# Patient Record
Sex: FEMALE | Race: WHITE
[De-identification: names, ages, dates, MRNs, and addresses within clinical notes are randomized per-mention and may not be internally consistent; named-entity substitution may affect disease eponyms.]

---

## 2017-04-21 ENCOUNTER — HOSPITAL ENCOUNTER (EMERGENCY)
Dept: HOSPITAL 23 - CED | Age: 27
Discharge: HOME | End: 2017-04-21
Payer: COMMERCIAL

## 2017-04-21 DIAGNOSIS — N39.0: Primary | ICD-10-CM

## 2017-04-21 LAB
AMYLASE: 7 U/L (ref 0–46)
BARBITURATES UR QL SCN: 0.5 MG/DL (ref 0.2–2)
BARBITURATES UR QL SCN: 4.1 G/DL (ref 3.5–5)
BASOPHIL#: 0.1 X10E3 (ref 0–0.3)
BASOPHIL%: 1 % (ref 0–2.5)
BENZODIAZ UR QL SCN: 12 U/L (ref 10–40)
BENZODIAZ UR QL SCN: 17 U/L (ref 10–42)
BLOOD UREA NITROGEN: 13 MG/DL (ref 9–23)
BUN/CREATININE RATIO: 18.57
BZE UR QL SCN: 45 U/L (ref 32–92)
CALCIUM SERUM: 8.9 MG/DL (ref 8.4–10.2)
CK MB SERPL-RTO: 13.1 % (ref 11–15.5)
CK MB SERPL-RTO: 33.6 G/DL (ref 30–36)
CREATININE SERUM: 0.7 MG/DL (ref 0.6–1.4)
DIFF IND: NO
EOSINOPHIL#: 0.2 X10E3 (ref 0–0.7)
EOSINOPHIL%: 2.6 % (ref 0–7)
GENTAMICIN PEAK SERPL-MCNC: YES MG/L
GLOM FILT RATE ESTIMATED: 117.9 ML/MIN (ref 60–?)
GLUCOSE FASTING: 80 MG/DL (ref 70–110)
HEMATOCRIT: 39.6 % (ref 35–45)
HEMOGLOBIN: 13.3 GM/DL (ref 12–16)
KETONES UR QL: 105 MMOL/L (ref 100–111)
KETONES UR QL: 24 MMOL/L (ref 22–31)
LIPASE: 20 U/L (ref 22–51)
LYMPHOCYTE#: 2.5 X10E3 (ref 1–3.5)
LYMPHOCYTE%: 37.1 % (ref 17–45)
MEAN CELL VOLUME: 95.3 FL (ref 83–96)
MEAN CORPUSCULAR HEMOGLOBIN: 32 PG (ref 28–34)
MEAN PLATELET VOLUME: 8.2 FL (ref 6.5–11.5)
MONOCYTE#: 0.5 X10E3 (ref 0–1)
MONOCYTE%: 7.6 % (ref 3–12)
NEUTROPHIL#: 3.5 X10E3 (ref 1.5–7.1)
NEUTROPHIL%: 51.7 % (ref 40–75)
PLATELET COUNT: 290 X10E3 (ref 140–420)
POTASSIUM: 3.5 MMOL/L (ref 3.5–5.1)
PROTEIN TOTAL SERUM: 7 G/DL (ref 6–8.3)
RED BLOOD COUNT: 4.16 X10E (ref 3.9–5.3)
SODIUM: 138 MMOL/L (ref 135–145)
URBCS1 AUWI: (no result) /[HPF] (ref 0–2)
URINE APPEARANCE: (no result)
URINE BACTERIA AUWI: (no result)
URINE BILIRUBIN: (no result)
URINE BLOOD: (no result)
URINE COLOR: YELLOW
URINE GLUCOSE: (no result) MG/DL
URINE KETONE: (no result)
URINE LEUKOCYTE ESTERASE: (no result)
URINE NITRATE: (no result)
URINE PH: 6 (ref 5–8)
URINE PROTEIN: (no result)
URINE SOURCE: (no result)
URINE SPECIFIC GRAVITY: 1.01 (ref 1–1.03)
URINE SQUAMOUS EPITHELIAL CELL: (no result) /[HPF]
URINE UROBILINOGEN: 0.2 MG/DL
UWBCS1 AUWI: (no result) (ref 0–5)
WHITE BLOOD COUNT: 6.8 X10E3 (ref 4–10.5)

## 2018-01-29 ENCOUNTER — HOSPITAL ENCOUNTER (EMERGENCY)
Facility: HOSPITAL | Age: 28
Discharge: HOME | End: 2018-01-29
Attending: EMERGENCY MEDICINE | Admitting: EMERGENCY MEDICINE

## 2018-01-29 VITALS
BODY MASS INDEX: 21.71 KG/M2 | HEIGHT: 61 IN | DIASTOLIC BLOOD PRESSURE: 70 MMHG | TEMPERATURE: 98.9 F | RESPIRATION RATE: 18 BRPM | WEIGHT: 115 LBS | SYSTOLIC BLOOD PRESSURE: 108 MMHG | OXYGEN SATURATION: 98 % | HEART RATE: 80 BPM

## 2018-01-29 DIAGNOSIS — K08.89 PAIN, DENTAL: Primary | ICD-10-CM

## 2018-01-29 LAB
FLUAV AG NPH QL: NEGATIVE
FLUBV AG NPH QL IA: NEGATIVE

## 2018-01-29 PROCEDURE — 87804 INFLUENZA ASSAY W/OPTIC: CPT | Performed by: PHYSICIAN ASSISTANT

## 2018-01-29 PROCEDURE — 99283 EMERGENCY DEPT VISIT LOW MDM: CPT

## 2018-01-29 RX ORDER — PENICILLIN V POTASSIUM 500 MG/1
500 TABLET ORAL 4 TIMES DAILY
COMMUNITY

## 2018-01-29 RX ORDER — ONDANSETRON 4 MG/1
4 TABLET, ORALLY DISINTEGRATING ORAL ONCE
Status: COMPLETED | OUTPATIENT
Start: 2018-01-29 | End: 2018-01-29

## 2018-01-29 RX ORDER — ACETAMINOPHEN AND CODEINE PHOSPHATE 300; 30 MG/1; MG/1
1 TABLET ORAL ONCE
Status: COMPLETED | OUTPATIENT
Start: 2018-01-29 | End: 2018-01-29

## 2018-01-29 RX ORDER — ACETAMINOPHEN AND CODEINE PHOSPHATE 300; 30 MG/1; MG/1
1 TABLET ORAL EVERY 4 HOURS PRN
Qty: 12 TABLET | Refills: 0 | Status: SHIPPED | OUTPATIENT
Start: 2018-01-29

## 2018-01-29 RX ADMIN — ONDANSETRON 4 MG: 4 TABLET, ORALLY DISINTEGRATING ORAL at 14:17

## 2018-01-29 RX ADMIN — BENZOCAINE 1 EACH: 200 LIQUID DENTAL; ORAL; PERIODONTAL at 14:17

## 2018-01-29 RX ADMIN — ACETAMINOPHEN AND CODEINE PHOSPHATE 1 TABLET: 300; 30 TABLET ORAL at 14:17

## 2021-08-25 ENCOUNTER — E-VISIT (OUTPATIENT)
Dept: FAMILY MEDICINE CLINIC | Facility: TELEHEALTH | Age: 31
End: 2021-08-25

## 2021-08-25 DIAGNOSIS — B34.9 VIRAL ILLNESS: Primary | ICD-10-CM

## 2021-08-25 DIAGNOSIS — H10.9 BACTERIAL CONJUNCTIVITIS: ICD-10-CM

## 2021-08-25 PROCEDURE — 99422 OL DIG E/M SVC 11-20 MIN: CPT | Performed by: NURSE PRACTITIONER

## 2021-08-25 RX ORDER — BENZONATATE 100 MG/1
100 CAPSULE ORAL 3 TIMES DAILY PRN
Qty: 21 CAPSULE | Refills: 0 | Status: SHIPPED | OUTPATIENT
Start: 2021-08-25

## 2021-08-25 RX ORDER — TOBRAMYCIN 3 MG/ML
2 SOLUTION/ DROPS OPHTHALMIC
Qty: 4 ML | Refills: 0 | Status: SHIPPED | OUTPATIENT
Start: 2021-08-25 | End: 2021-09-01

## 2021-08-26 NOTE — PATIENT INSTRUCTIONS
Go to the nearest [facility], [place], operated by [facility] for your Covid-19 test. Wear a mask, call when you arrive, you will receive further instructions from the urgent care staff. You will be notified in 1-5 days about the results of your test, by telephone call from a blocked cell number, and via [employer].    -Cool compresses to eyes multiple times a day  -You are contagious for 24 hours after starting eye drops  -Do not wear contacts until healed.  -Discard contacts and eye make up used prior to infection  -If symptoms worsen or persist, follow up with opthamologist      Viral Illness, Adult  Viruses are tiny germs that can get into a person's body and cause illness. There are many different types of viruses, and they cause many types of illness. Viral illnesses can range from mild to severe. They can affect various parts of the body.  Common illnesses that are caused by a virus include colds and the flu (influenza). Viral illnesses also include serious conditions, such as HIV (human immunodeficiency virus) infection and AIDS (acquired immunodeficiency syndrome). A few viruses have been linked to certain cancers.  What are the causes?  Many types of viruses can cause illness. Viruses invade cells in your body, multiply, and cause the infected cells to work abnormally or die. When these cells die, they release more of the virus. When this happens, you develop symptoms of the illness, and the virus continues to spread to other cells. If the virus takes over the function of the cell, it can cause the cell to divide and grow out of control. This happens when a virus causes cancer.  Different viruses get into the body in different ways. You can get a virus by:  · Swallowing food or water that has come in contact with the virus (is contaminated).  · Breathing in droplets that have been coughed or sneezed into the air by an infected person.  · Touching a surface that has been contaminated with the virus and then touching your eyes, nose,  or mouth.  · Being bitten by an insect or animal that carries the virus.  · Having sexual contact with a person who is infected with the virus.  · Being exposed to blood or fluids that contain the virus, either through an open cut or during a transfusion.  If a virus enters your body, your body's defense system (immune system) will try to fight the virus. You may be at higher risk for a viral illness if your immune system is weak.  What are the signs or symptoms?  You may have these symptoms, depending on the type of virus and the location of the cells that it invades:  · Cold and flu viruses:  ? Fever.  ? Headache.  ? Sore throat.  ? Muscle aches.  ? Stuffy nose (nasal congestion).  ? Cough.  · Digestive system (gastrointestinal) viruses:  ? Fever.  ? Pain in the abdomen.  ? Nausea.  ? Diarrhea.  · Liver viruses (hepatitis):  ? Loss of appetite.  ? Tiredness.  ? Skin or the white parts of your eyes turning yellow (jaundice).  · Brain and spinal cord viruses:  ? Fever.  ? Headache.  ? Stiff neck.  ? Nausea and vomiting.  ? Confusion or sleepiness.  · Skin viruses:  ? Warts.  ? Itching.  ? Rash.  · Sexually transmitted viruses:  ? Discharge.  ? Swelling.  ? Redness.  ? Rash.  How is this diagnosed?  This condition may be diagnosed based on one or more of the following:  · Symptoms.  · Medical history.  · Physical exam.  · Blood test, sample of mucus from your lungs (sputum sample), stool sample, or a swab of body fluids or a skin sore (lesion).  How is this treated?  Viruses can be hard to treat because they live within cells. Antibiotic medicines do not treat viruses because these medicines do not get inside cells. Treatment for a viral illness may include:  · Resting and drinking plenty of fluids.  · Medicines to relieve symptoms. These can include over-the-counter medicine for pain and fever, medicines for cough or congestion, and medicines to relieve diarrhea.  · Antiviral medicines. These medicines are available  only for certain types of viruses. They may help reduce flu symptoms if taken early in the infection. There are also many antiviral medicines for hepatitis and for HIV and AIDS.  Some viral illnesses can be prevented with vaccinations. A common example is the flu shot.  Follow these instructions at home:  Medicines  · Take over-the-counter and prescription medicines only as told by your health care provider.  · If you were prescribed an antiviral medicine, take it as told by your health care provider. Do not stop taking the antiviral even if you start to feel better.  · Be aware of when antibiotics are needed and when they are not needed. Antibiotics do not treat viruses. You may get an antibiotic if your health care provider thinks that you may have, or are at risk for, a bacterial infection and you have a viral infection.  ? Do not ask for an antibiotic prescription if you have been diagnosed with a viral illness. Antibiotics will not make your illness go away faster.  ? Frequently taking antibiotics when they are not needed can lead to antibiotic resistance. When this develops, the medicine no longer works against the bacteria that it normally fights.  General instructions    · Drink enough fluids to keep your urine pale yellow.  · Rest as much as possible.  · Return to your normal activities as told by your health care provider. Ask your health care provider what activities are safe for you.  · Keep all follow-up visits as told by your health care provider. This is important.  How is this prevented?  To reduce your risk of viral illness:  · Wash your hands often with soap and water for at least 20 seconds. If soap and water are not available, use hand .  · Avoid touching your nose, eyes, and mouth, especially if you have not washed your hands recently.  · If anyone in your household has a viral infection, clean all household surfaces that may have been in contact with the virus. Use soap and hot water.  You may also use bleach that you have added water to (diluted).  · Stay away from people who are sick with symptoms of a viral infection.  · Do not share items such as toothbrushes and water bottles with other people.  · Keep your vaccinations up to date. This includes getting a yearly flu shot.  · Eat a healthy diet and get plenty of rest.  Contact a health care provider if:  · You have symptoms of a viral illness that do not go away.  · Your symptoms come back after going away.  · Your symptoms get worse.  Get help right away if you have:  · Trouble breathing.  · A severe headache or a stiff neck.  · Severe vomiting or pain in your abdomen.  These symptoms may represent a serious problem that is an emergency. Do not wait to see if the symptoms will go away. Get medical help right away. Call your local emergency services (911 in the [place] Do not drive yourself to the hospital.  Summary  · Viruses are types of germs that can get into a person's body and cause illness. Viral illnesses can range from mild to severe. They can affect various parts of the body.  · Viruses can be hard to treat. There are medicines to relieve symptoms, and there are some antiviral medicines.  · If you were prescribed an antiviral medicine, take it as told by your health care provider. Do not stop taking the antiviral even if you start to feel better.  · Contact a health care provider if you have symptoms of a viral illness that do not go away.  This information is not intended to replace advice given to you by your health care provider. Make sure you discuss any questions you have with your health care provider.  Document Revised: 10/27/2020 Document Reviewed: 10/27/2020  Elsevier Patient Education © 2021 [employer]  Bacterial Conjunctivitis, Adult  Bacterial conjunctivitis is an infection of the clear membrane that covers the white part of your eye and the inner surface of your eyelid (conjunctiva). When the blood vessels in your  conjunctiva become inflamed, your eye becomes red or pink, and it will probably feel itchy. Bacterial conjunctivitis spreads very easily from person to person (is contagious). It also spreads easily from one eye to the other eye.  What are the causes?  This condition is caused by bacteria. You may get the infection if you come into close contact with:  · A person who is infected with the bacteria.  · Items that are contaminated with the bacteria, such as a face towel, contact lens solution, or eye makeup.  What increases the risk?  You are more likely to develop this condition if you:  · Are exposed to other people who have the infection.  · Wear contact lenses.  · Have a sinus infection.  · Have had a recent eye injury or surgery.  · Have a weak body defense system (immune system).  · Have a medical condition that causes dry eyes.  What are the signs or symptoms?  Symptoms of this condition include:  · Thick, yellowish discharge from the eye. This may turn into a crust on the eyelid overnight and cause your eyelids to stick together.  · Tearing or watery eyes.  · Itchy eyes.  · Burning feeling in your eyes.  · Eye redness.  · Swollen eyelids.  · Blurred vision.  How is this diagnosed?  This condition is diagnosed based on your symptoms and medical history. Your health care provider may also take a sample of discharge from your eye to find the cause of your infection. This is rarely done.  How is this treated?  This condition may be treated with:  · Antibiotic eye drops or ointment to clear the infection more quickly and prevent the spread of infection to others.  · Oral antibiotic medicines to treat infections that do not respond to drops or ointments or that last longer than 10 days.  · Cool, wet cloths (cool compresses) placed on the eyes.  · Artificial tears applied 2-6 times a day.  Follow these instructions at home:  Medicines  · Take or apply your antibiotic medicine as told by your health care provider. Do  not stop taking or applying the antibiotic even if you start to feel better.  · Take or apply over-the-counter and prescription medicines only as told by your health care provider.  · Be very careful to avoid touching the edge of your eyelid with the eye-drop bottle or the ointment tube when you apply medicines to the affected eye. This will keep you from spreading the infection to your other eye or to other people.  Managing discomfort  · Gently wipe away any drainage from your eye with a warm, wet washcloth or a cotton ball.  · Apply a clean, cool compress to your eye for 10-20 minutes, 3-4 times a day.  General instructions  · Do not wear contact lenses until the inflammation is gone and your health care provider says it is safe to wear them again. Ask your health care provider how to sterilize or replace your contact lenses before you use them again. Wear glasses until you can resume wearing contact lenses.  · Avoid wearing eye makeup until the inflammation is gone. Throw away any old eye cosmetics that may be contaminated.  · Change or wash your pillowcase every day.  · Do not share towels or washcloths. This may spread the infection.  · Wash your hands often with soap and water. Use paper towels to dry your hands.  · Avoid touching or rubbing your eyes.  · Do not drive or use heavy machinery if your vision is blurred.  Contact a health care provider if:  · You have a fever.  · Your symptoms do not get better after 10 days.  Get help right away if you have:  · A fever and your symptoms suddenly get worse.  · Severe pain when you move your eye.  · Facial pain, redness, or swelling.  · Sudden loss of vision.  Summary  · Bacterial conjunctivitis is an infection of the clear membrane that covers the white part of your eye and the inner surface of your eyelid (conjunctiva).  · Bacterial conjunctivitis spreads very easily from person to person (is contagious).  · Wash your hands often with soap and water. Use paper  towels to dry your hands.  · Take or apply your antibiotic medicine as told by your health care provider. Do not stop taking or applying the antibiotic even if you start to feel better.  · Contact a health care provider if you have a fever or your symptoms do not get better after 10 days.  This information is not intended to replace advice given to you by your health care provider. Make sure you discuss any questions you have with your health care provider.  Document Revised: 04/07/2020 Document Reviewed: 07/24/2019  [employer] Patient Education © 2021 [employer].  Benzonatate capsules  What is this medicine?  BENZONATATE (mary JACOB na yu) is used to treat cough.  This medicine may be used for other purposes; ask your health care provider or pharmacist if you have questions.  COMMON BRAND NAME(S): Adam Tan  What should I tell my health care provider before I take this medicine?  They need to know if you have any of these conditions:  · kidney or liver disease  · an unusual or allergic reaction to benzonatate, anesthetics, other medicines, foods, dyes, or preservatives  · pregnant or trying to get pregnant  · breast-feeding  How should I use this medicine?  Take this medicine by mouth with a glass of water. Follow the directions on the prescription label. Avoid breaking, chewing, or sucking the capsule, as this can cause serious side effects. Take your medicine at regular intervals. Do not take your medicine more often than directed.  Talk to your pediatrician regarding the use of this medicine in children. While this drug may be prescribed for children as young as 10 years old for selected conditions, precautions do apply.  Overdosage: If you think you have taken too much of this medicine contact a poison control center or emergency room at once.  NOTE: This medicine is only for you. Do not share this medicine with others.  What if I miss a dose?  If you miss a dose, take it as soon as you can. If  it is almost time for your next dose, take only that dose. Do not take double or extra doses.  What may interact with this medicine?  Do not take this medicine with any of the following medications:  · MAOIs like Carbex, Eldepryl, Marplan, Nardil, and Parnate  This list may not describe all possible interactions. Give your health care provider a list of all the medicines, herbs, non-prescription drugs, or dietary supplements you use. Also tell them if you smoke, drink alcohol, or use illegal drugs. Some items may interact with your medicine.  What should I watch for while using this medicine?  Tell your doctor if your symptoms do not improve or if they get worse. If you have a high fever, skin rash, or headache, see your health care professional.  You may get drowsy or dizzy. Do not drive, use machinery, or do anything that needs mental alertness until you know how this medicine affects you. Do not sit or stand up quickly, especially if you are an older patient. This reduces the risk of dizzy or fainting spells.  What side effects may I notice from receiving this medicine?  Side effects that you should report to your doctor or health care professional as soon as possible:  · allergic reactions like skin rash, itching or hives, swelling of the face, lips, or tongue  · breathing problems  · chest pain  · confusion or hallucinations  · irregular heartbeat  · numbness of mouth or throat  · seizures  Side effects that usually do not require medical attention (report to your doctor or health care professional if they continue or are bothersome):  · burning feeling in the eyes  · constipation  · headache  · nasal congestion  · stomach upset  This list may not describe all possible side effects. Call your doctor for medical advice about side effects. You may report side effects to FDA at 5-862-FDA-2039.  Where should I keep my medicine?  Keep out of the reach of children.  Store at room temperature between 15 and 30 degrees  C (59 and 86 degrees F). Keep tightly closed. Protect from light and moisture. Throw away any unused medicine after the expiration date.  NOTE: This sheet is a summary. It may not cover all possible information. If you have questions about this medicine, talk to your doctor, pharmacist, or health care provider.  © 2021 [employer] (2009-03-18 14:52:56)  Tobramycin eye solution  What is this medicine?  TOBRAMYCIN (toe bra MYE sin) is an aminoglycoside antibiotic. It is used to treat bacterial eye infections.  This medicine may be used for other purposes; ask your health care provider or pharmacist if you have questions.  COMMON BRAND NAME(S): AK-Tob, Tobrasol, Tobrex  What should I tell my health care provider before I take this medicine?  They need to know if you have any of these conditions:  · an unusual or allergic reaction to tobramycin, other medicines, foods, dyes or preservatives  · pregnant or trying to get pregnant  · breast-feeding  How should I use this medicine?  This medicine is only for use in the eye. Follow the directions on the prescription label. Wash hands before and after use. Tilt your head back slightly and pull your lower eyelid down with your index finger to form a pouch. Try not to touch the tip of the dropper or tube to your eye, fingertips, or other surfaces. Squeeze the prescribed number of drops into the pouch. Close the eye gently to spread the drops. Do not use your medicine more often than directed. Finish the full course of medicine prescribed by your doctor or health care professional even if you think your condition is better. Do not stop using except on the advice of your doctor or health care professional.  Talk to your pediatrician regarding the use of this medicine in children. While this drug may be prescribed for children as young as 2 months for selected conditions, precautions do apply.  Overdosage: If you think you have taken too much of this medicine contact a  poison control center or emergency room at once.  NOTE: This medicine is only for you. Do not share this medicine with others.  What if I miss a dose?  If you miss a dose, use it as soon as you can. If it is almost time for your next dose, use only that dose. Do not use double or extra doses.  What may interact with this medicine?  Interactions are not expected. Do not use any other eye products without talking to your health care professional.  This list may not describe all possible interactions. Give your health care provider a list of all the medicines, herbs, non-prescription drugs, or dietary supplements you use. Also tell them if you smoke, drink alcohol, or use illegal drugs. Some items may interact with your medicine.  What should I watch for while using this medicine?  Tell your health care professional if your symptoms do not start to get better or if they get worse.  You should not wear contact lenses while you have signs and symptoms of an eye infection. If you wear contact lenses, ask your health care professional when you can wear your lenses again.  To prevent the spread of infection, do not share eye products, towels, and washcloths with anyone else.  This medicine may cause serious skin reactions. They can happen weeks to months after starting the medicine. Contact your health care provider right away if you notice fevers or flu-like symptoms with a rash. The rash may be red or purple and then turn into blisters or peeling of the skin. Or, you might notice a red rash with swelling of the face, lips or lymph nodes in your neck or under your arms.  What side effects may I notice from receiving this medicine?  Side effects that you should report to your doctor or health care professional as soon as possible:  · allergic reactions like skin rash, itching or hives, swelling of the face, lips, or tongue  · redness, blistering, peeling, or loosening of the skin, including inside the mouth  Side effects  that usually do not require medical attention (report to your doctor or health care professional if they continue or are bothersome):  · eye irritation, itching  This list may not describe all possible side effects. Call your doctor for medical advice about side effects. You may report side effects to FDA at 8-763-TAS-4480.  Where should I keep my medicine?  Keep out of the reach of children.  Store between 2 and 25 degrees C (36 and 77 degrees F). Throw away any unused medicine after the expiration date.  NOTE: This sheet is a summary. It may not cover all possible information. If you have questions about this medicine, talk to your doctor, pharmacist, or health care provider.  © 2021 [employer]vier/Gold Standard (2020-05-06 12:27:03)

## 2021-08-26 NOTE — PROGRESS NOTES
I reviewed the patient's evisit. Dx viral illness, bacterial conjunctivitis. Rx sent for tessalon pearles, tobrex eye drops. Covid-19 test ordered. AvS available in [facility]. I spent 11-20 minutes in the patient's chart.

## 2021-10-07 ENCOUNTER — E-VISIT (OUTPATIENT)
Dept: FAMILY MEDICINE CLINIC | Facility: TELEHEALTH | Age: 31
End: 2021-10-07

## 2025-03-08 ENCOUNTER — HOSPITAL ENCOUNTER (OUTPATIENT)
Facility: HOSPITAL | Age: 35
Setting detail: OBSERVATION
Discharge: HOME | End: 2025-03-12
Attending: EMERGENCY MEDICINE | Admitting: INTERNAL MEDICINE
Payer: COMMERCIAL

## 2025-03-08 ENCOUNTER — APPOINTMENT (OUTPATIENT)
Dept: GENERAL RADIOLOGY | Facility: HOSPITAL | Age: 35
End: 2025-03-08
Payer: COMMERCIAL

## 2025-03-08 DIAGNOSIS — R53.81 DEBILITY: ICD-10-CM

## 2025-03-08 DIAGNOSIS — R53.83 LETHARGY: ICD-10-CM

## 2025-03-08 DIAGNOSIS — R94.31 LONG QT INTERVAL: Primary | ICD-10-CM

## 2025-03-08 DIAGNOSIS — F31.9 BIPOLAR 1 DISORDER: ICD-10-CM

## 2025-03-08 DIAGNOSIS — R53.83 PROFOUND FATIGUE: ICD-10-CM

## 2025-03-08 DIAGNOSIS — R55 SYNCOPE AND COLLAPSE: ICD-10-CM

## 2025-03-08 DIAGNOSIS — G47.10 HYPERSOMNIA: ICD-10-CM

## 2025-03-08 PROBLEM — F98.8 ADD (ATTENTION DEFICIT DISORDER): Status: ACTIVE | Noted: 2025-03-08

## 2025-03-08 PROBLEM — F41.9 ANXIETY DISORDER: Status: ACTIVE | Noted: 2025-03-08

## 2025-03-08 PROBLEM — E87.6 HYPOKALEMIA: Status: ACTIVE | Noted: 2025-03-08

## 2025-03-08 LAB
ALBUMIN SERPL-MCNC: 4.3 G/DL (ref 3.5–5.2)
ALBUMIN/GLOB SERPL: 1.2 G/DL
ALP SERPL-CCNC: 89 U/L (ref 39–117)
ALT SERPL W P-5'-P-CCNC: 16 U/L (ref 1–33)
ANION GAP SERPL CALCULATED.3IONS-SCNC: 15 MMOL/L (ref 5–15)
AST SERPL-CCNC: 24 U/L (ref 1–32)
B PARAPERT DNA SPEC QL NAA+PROBE: NOT DETECTED
B PERT DNA SPEC QL NAA+PROBE: NOT DETECTED
BASOPHILS # BLD AUTO: 0.06 10*3/MM3 (ref 0–0.2)
BASOPHILS NFR BLD AUTO: 0.8 % (ref 0–1.5)
BILIRUB SERPL-MCNC: 0.2 MG/DL (ref 0–1.2)
BILIRUB UR QL STRIP: NEGATIVE
BUN SERPL-MCNC: 17 MG/DL (ref 6–20)
BUN/CREAT SERPL: 15.2 (ref 7–25)
C PNEUM DNA NPH QL NAA+NON-PROBE: NOT DETECTED
CALCIUM SPEC-SCNC: 9.3 MG/DL (ref 8.6–10.5)
CHLORIDE SERPL-SCNC: 93 MMOL/L (ref 98–107)
CK SERPL-CCNC: 71 U/L (ref 20–180)
CLARITY UR: CLEAR
CO2 SERPL-SCNC: 28 MMOL/L (ref 22–29)
COLOR UR: YELLOW
CREAT SERPL-MCNC: 1.12 MG/DL (ref 0.57–1)
DEPRECATED RDW RBC AUTO: 40.1 FL (ref 37–54)
EGFRCR SERPLBLD CKD-EPI 2021: 65.5 ML/MIN/1.73
EOSINOPHIL # BLD AUTO: 0.03 10*3/MM3 (ref 0–0.4)
EOSINOPHIL NFR BLD AUTO: 0.4 % (ref 0.3–6.2)
ERYTHROCYTE [DISTWIDTH] IN BLOOD BY AUTOMATED COUNT: 11.8 % (ref 12.3–15.4)
FLUAV SUBTYP SPEC NAA+PROBE: NOT DETECTED
FLUBV RNA ISLT QL NAA+PROBE: NOT DETECTED
GLOBULIN UR ELPH-MCNC: 3.5 GM/DL
GLUCOSE SERPL-MCNC: 111 MG/DL (ref 65–99)
GLUCOSE UR STRIP-MCNC: NEGATIVE MG/DL
HADV DNA SPEC NAA+PROBE: NOT DETECTED
HCG SERPL QL: NEGATIVE
HCOV 229E RNA SPEC QL NAA+PROBE: NOT DETECTED
HCOV HKU1 RNA SPEC QL NAA+PROBE: NOT DETECTED
HCOV NL63 RNA SPEC QL NAA+PROBE: NOT DETECTED
HCOV OC43 RNA SPEC QL NAA+PROBE: NOT DETECTED
HCT VFR BLD AUTO: 44.7 % (ref 34–46.6)
HGB BLD-MCNC: 15.3 G/DL (ref 12–15.9)
HGB UR QL STRIP.AUTO: NEGATIVE
HMPV RNA NPH QL NAA+NON-PROBE: NOT DETECTED
HPIV1 RNA ISLT QL NAA+PROBE: NOT DETECTED
HPIV2 RNA SPEC QL NAA+PROBE: NOT DETECTED
HPIV3 RNA NPH QL NAA+PROBE: NOT DETECTED
HPIV4 P GENE NPH QL NAA+PROBE: NOT DETECTED
IMM GRANULOCYTES # BLD AUTO: 0.02 10*3/MM3 (ref 0–0.05)
IMM GRANULOCYTES NFR BLD AUTO: 0.3 % (ref 0–0.5)
KETONES UR QL STRIP: NEGATIVE
LEUKOCYTE ESTERASE UR QL STRIP.AUTO: NEGATIVE
LITHIUM SERPL-SCNC: <0.1 MMOL/L (ref 0.6–1.2)
LYMPHOCYTES # BLD AUTO: 2.5 10*3/MM3 (ref 0.7–3.1)
LYMPHOCYTES NFR BLD AUTO: 32 % (ref 19.6–45.3)
M PNEUMO IGG SER IA-ACNC: NOT DETECTED
MAGNESIUM SERPL-MCNC: 2.3 MG/DL (ref 1.6–2.6)
MCH RBC QN AUTO: 31.5 PG (ref 26.6–33)
MCHC RBC AUTO-ENTMCNC: 34.2 G/DL (ref 31.5–35.7)
MCV RBC AUTO: 92 FL (ref 79–97)
MONOCYTES # BLD AUTO: 0.7 10*3/MM3 (ref 0.1–0.9)
MONOCYTES NFR BLD AUTO: 9 % (ref 5–12)
NEUTROPHILS NFR BLD AUTO: 4.51 10*3/MM3 (ref 1.7–7)
NEUTROPHILS NFR BLD AUTO: 57.5 % (ref 42.7–76)
NITRITE UR QL STRIP: NEGATIVE
NRBC BLD AUTO-RTO: 0 /100 WBC (ref 0–0.2)
PH UR STRIP.AUTO: 7.5 [PH] (ref 5–8)
PHOSPHATE SERPL-MCNC: 3.7 MG/DL (ref 2.5–4.5)
PLATELET # BLD AUTO: 478 10*3/MM3 (ref 140–450)
PMV BLD AUTO: 9.3 FL (ref 6–12)
POTASSIUM SERPL-SCNC: 3.1 MMOL/L (ref 3.5–5.2)
PROT SERPL-MCNC: 7.8 G/DL (ref 6–8.5)
PROT UR QL STRIP: NEGATIVE
RBC # BLD AUTO: 4.86 10*6/MM3 (ref 3.77–5.28)
RHINOVIRUS RNA SPEC NAA+PROBE: NOT DETECTED
RSV RNA NPH QL NAA+NON-PROBE: NOT DETECTED
SARS-COV-2 RNA NPH QL NAA+NON-PROBE: NOT DETECTED
SODIUM SERPL-SCNC: 136 MMOL/L (ref 136–145)
SP GR UR STRIP: 1.01 (ref 1–1.03)
TSH SERPL DL<=0.05 MIU/L-ACNC: 0.9 UIU/ML (ref 0.27–4.2)
UROBILINOGEN UR QL STRIP: NORMAL
WBC NRBC COR # BLD AUTO: 7.82 10*3/MM3 (ref 3.4–10.8)

## 2025-03-08 PROCEDURE — G0378 HOSPITAL OBSERVATION PER HR: HCPCS

## 2025-03-08 PROCEDURE — 99285 EMERGENCY DEPT VISIT HI MDM: CPT

## 2025-03-08 PROCEDURE — 84100 ASSAY OF PHOSPHORUS: CPT | Performed by: EMERGENCY MEDICINE

## 2025-03-08 PROCEDURE — 84443 ASSAY THYROID STIM HORMONE: CPT | Performed by: INTERNAL MEDICINE

## 2025-03-08 PROCEDURE — 84703 CHORIONIC GONADOTROPIN ASSAY: CPT | Performed by: EMERGENCY MEDICINE

## 2025-03-08 PROCEDURE — 83735 ASSAY OF MAGNESIUM: CPT | Performed by: EMERGENCY MEDICINE

## 2025-03-08 PROCEDURE — 25810000003 LACTATED RINGERS SOLUTION: Performed by: EMERGENCY MEDICINE

## 2025-03-08 PROCEDURE — 25010000002 MAGNESIUM SULFATE 2 GM/50ML SOLUTION: Performed by: EMERGENCY MEDICINE

## 2025-03-08 PROCEDURE — 0202U NFCT DS 22 TRGT SARS-COV-2: CPT | Performed by: EMERGENCY MEDICINE

## 2025-03-08 PROCEDURE — 80178 ASSAY OF LITHIUM: CPT | Performed by: EMERGENCY MEDICINE

## 2025-03-08 PROCEDURE — 80307 DRUG TEST PRSMV CHEM ANLYZR: CPT | Performed by: INTERNAL MEDICINE

## 2025-03-08 PROCEDURE — 80175 DRUG SCREEN QUAN LAMOTRIGINE: CPT | Performed by: EMERGENCY MEDICINE

## 2025-03-08 PROCEDURE — 93005 ELECTROCARDIOGRAM TRACING: CPT | Performed by: EMERGENCY MEDICINE

## 2025-03-08 PROCEDURE — 96366 THER/PROPH/DIAG IV INF ADDON: CPT

## 2025-03-08 PROCEDURE — 93010 ELECTROCARDIOGRAM REPORT: CPT | Performed by: INTERNAL MEDICINE

## 2025-03-08 PROCEDURE — 82550 ASSAY OF CK (CPK): CPT | Performed by: EMERGENCY MEDICINE

## 2025-03-08 PROCEDURE — 71045 X-RAY EXAM CHEST 1 VIEW: CPT

## 2025-03-08 PROCEDURE — 81003 URINALYSIS AUTO W/O SCOPE: CPT | Performed by: EMERGENCY MEDICINE

## 2025-03-08 PROCEDURE — 80053 COMPREHEN METABOLIC PANEL: CPT | Performed by: EMERGENCY MEDICINE

## 2025-03-08 PROCEDURE — 96365 THER/PROPH/DIAG IV INF INIT: CPT

## 2025-03-08 PROCEDURE — 85025 COMPLETE CBC W/AUTO DIFF WBC: CPT | Performed by: EMERGENCY MEDICINE

## 2025-03-08 RX ORDER — AMOXICILLIN 250 MG
2 CAPSULE ORAL 2 TIMES DAILY
Status: DISCONTINUED | OUTPATIENT
Start: 2025-03-08 | End: 2025-03-12 | Stop reason: HOSPADM

## 2025-03-08 RX ORDER — SODIUM CHLORIDE 9 MG/ML
100 INJECTION, SOLUTION INTRAVENOUS CONTINUOUS
Status: ACTIVE | OUTPATIENT
Start: 2025-03-08 | End: 2025-03-10

## 2025-03-08 RX ORDER — BISACODYL 10 MG
10 SUPPOSITORY, RECTAL RECTAL DAILY PRN
Status: DISCONTINUED | OUTPATIENT
Start: 2025-03-08 | End: 2025-03-12 | Stop reason: HOSPADM

## 2025-03-08 RX ORDER — ACETAMINOPHEN 650 MG/1
650 SUPPOSITORY RECTAL EVERY 4 HOURS PRN
Status: DISCONTINUED | OUTPATIENT
Start: 2025-03-08 | End: 2025-03-12 | Stop reason: HOSPADM

## 2025-03-08 RX ORDER — SODIUM CHLORIDE 0.9 % (FLUSH) 0.9 %
10 SYRINGE (ML) INJECTION AS NEEDED
Status: DISCONTINUED | OUTPATIENT
Start: 2025-03-08 | End: 2025-03-12 | Stop reason: HOSPADM

## 2025-03-08 RX ORDER — MAGNESIUM SULFATE HEPTAHYDRATE 40 MG/ML
2 INJECTION, SOLUTION INTRAVENOUS ONCE
Status: COMPLETED | OUTPATIENT
Start: 2025-03-08 | End: 2025-03-09

## 2025-03-08 RX ORDER — SODIUM CHLORIDE 9 MG/ML
40 INJECTION, SOLUTION INTRAVENOUS AS NEEDED
Status: DISCONTINUED | OUTPATIENT
Start: 2025-03-08 | End: 2025-03-12 | Stop reason: HOSPADM

## 2025-03-08 RX ORDER — ACETAMINOPHEN 325 MG/1
650 TABLET ORAL EVERY 4 HOURS PRN
Status: DISCONTINUED | OUTPATIENT
Start: 2025-03-08 | End: 2025-03-12 | Stop reason: HOSPADM

## 2025-03-08 RX ORDER — NITROGLYCERIN 0.4 MG/1
0.4 TABLET SUBLINGUAL
Status: DISCONTINUED | OUTPATIENT
Start: 2025-03-08 | End: 2025-03-12 | Stop reason: HOSPADM

## 2025-03-08 RX ORDER — POLYETHYLENE GLYCOL 3350 17 G/17G
17 POWDER, FOR SOLUTION ORAL DAILY PRN
Status: DISCONTINUED | OUTPATIENT
Start: 2025-03-08 | End: 2025-03-12 | Stop reason: HOSPADM

## 2025-03-08 RX ORDER — SODIUM CHLORIDE 0.9 % (FLUSH) 0.9 %
10 SYRINGE (ML) INJECTION EVERY 12 HOURS SCHEDULED
Status: DISCONTINUED | OUTPATIENT
Start: 2025-03-08 | End: 2025-03-12 | Stop reason: HOSPADM

## 2025-03-08 RX ORDER — ACETAMINOPHEN 160 MG/5ML
650 SOLUTION ORAL EVERY 4 HOURS PRN
Status: DISCONTINUED | OUTPATIENT
Start: 2025-03-08 | End: 2025-03-12 | Stop reason: HOSPADM

## 2025-03-08 RX ORDER — BISACODYL 5 MG/1
5 TABLET, DELAYED RELEASE ORAL DAILY PRN
Status: DISCONTINUED | OUTPATIENT
Start: 2025-03-08 | End: 2025-03-12 | Stop reason: HOSPADM

## 2025-03-08 RX ADMIN — MAGNESIUM SULFATE HEPTAHYDRATE 2 G: 40 INJECTION, SOLUTION INTRAVENOUS at 21:31

## 2025-03-08 RX ADMIN — SODIUM CHLORIDE, POTASSIUM CHLORIDE, SODIUM LACTATE AND CALCIUM CHLORIDE 1000 ML: 600; 310; 30; 20 INJECTION, SOLUTION INTRAVENOUS at 20:26

## 2025-03-08 NOTE — LETTER
March 10, 2025     Patient: Francisco Frye   YOB: 1990   Date of Visit: 3/8/2025       To Whom It May Concern:    It is my medical opinion that Francisco Frye {Work release (duty restriction):97078}.    Patient is currently being treated in the hospital from 3/8/2025- 3/10/2025.         Sincerely,        No name on file    CC: No Recipients

## 2025-03-09 ENCOUNTER — APPOINTMENT (OUTPATIENT)
Dept: NEUROLOGY | Facility: HOSPITAL | Age: 35
End: 2025-03-09
Payer: COMMERCIAL

## 2025-03-09 LAB
AMPHET+METHAMPHET UR QL: POSITIVE
AMPHETAMINES UR QL: NEGATIVE
ANION GAP SERPL CALCULATED.3IONS-SCNC: 12 MMOL/L (ref 5–15)
BARBITURATES UR QL SCN: NEGATIVE
BASOPHILS # BLD AUTO: 0.06 10*3/MM3 (ref 0–0.2)
BASOPHILS NFR BLD AUTO: 0.9 % (ref 0–1.5)
BENZODIAZ UR QL SCN: NEGATIVE
BUN SERPL-MCNC: 18 MG/DL (ref 6–20)
BUN/CREAT SERPL: 19.6 (ref 7–25)
BUPRENORPHINE SERPL-MCNC: NEGATIVE NG/ML
CALCIUM SPEC-SCNC: 8.9 MG/DL (ref 8.6–10.5)
CANNABINOIDS SERPL QL: POSITIVE
CHLORIDE SERPL-SCNC: 95 MMOL/L (ref 98–107)
CO2 SERPL-SCNC: 28 MMOL/L (ref 22–29)
COCAINE UR QL: NEGATIVE
CREAT SERPL-MCNC: 0.92 MG/DL (ref 0.57–1)
DEPRECATED RDW RBC AUTO: 40.2 FL (ref 37–54)
EGFRCR SERPLBLD CKD-EPI 2021: 82.9 ML/MIN/1.73
EOSINOPHIL # BLD AUTO: 0.07 10*3/MM3 (ref 0–0.4)
EOSINOPHIL NFR BLD AUTO: 1 % (ref 0.3–6.2)
ERYTHROCYTE [DISTWIDTH] IN BLOOD BY AUTOMATED COUNT: 11.8 % (ref 12.3–15.4)
FENTANYL UR-MCNC: NEGATIVE NG/ML
FOLATE SERPL-MCNC: 10.7 NG/ML (ref 4.78–24.2)
GLUCOSE SERPL-MCNC: 118 MG/DL (ref 65–99)
HCT VFR BLD AUTO: 40.6 % (ref 34–46.6)
HGB BLD-MCNC: 13.9 G/DL (ref 12–15.9)
IMM GRANULOCYTES # BLD AUTO: 0.02 10*3/MM3 (ref 0–0.05)
IMM GRANULOCYTES NFR BLD AUTO: 0.3 % (ref 0–0.5)
LYMPHOCYTES # BLD AUTO: 1.98 10*3/MM3 (ref 0.7–3.1)
LYMPHOCYTES NFR BLD AUTO: 28.8 % (ref 19.6–45.3)
MCH RBC QN AUTO: 31.4 PG (ref 26.6–33)
MCHC RBC AUTO-ENTMCNC: 34.2 G/DL (ref 31.5–35.7)
MCV RBC AUTO: 91.6 FL (ref 79–97)
METHADONE UR QL SCN: NEGATIVE
MONOCYTES # BLD AUTO: 0.72 10*3/MM3 (ref 0.1–0.9)
MONOCYTES NFR BLD AUTO: 10.5 % (ref 5–12)
NEUTROPHILS NFR BLD AUTO: 4.03 10*3/MM3 (ref 1.7–7)
NEUTROPHILS NFR BLD AUTO: 58.5 % (ref 42.7–76)
NRBC BLD AUTO-RTO: 0 /100 WBC (ref 0–0.2)
OPIATES UR QL: NEGATIVE
OXYCODONE UR QL SCN: NEGATIVE
PCP UR QL SCN: NEGATIVE
PLATELET # BLD AUTO: 453 10*3/MM3 (ref 140–450)
PMV BLD AUTO: 9.3 FL (ref 6–12)
POTASSIUM SERPL-SCNC: 2.6 MMOL/L (ref 3.5–5.2)
POTASSIUM SERPL-SCNC: 3.3 MMOL/L (ref 3.5–5.2)
QT INTERVAL: 480 MS
QTC INTERVAL: 608 MS
RBC # BLD AUTO: 4.43 10*6/MM3 (ref 3.77–5.28)
SODIUM SERPL-SCNC: 135 MMOL/L (ref 136–145)
TRICYCLICS UR QL SCN: NEGATIVE
VIT B12 BLD-MCNC: 301 PG/ML (ref 211–946)
WBC NRBC COR # BLD AUTO: 6.88 10*3/MM3 (ref 3.4–10.8)

## 2025-03-09 PROCEDURE — 80048 BASIC METABOLIC PNL TOTAL CA: CPT | Performed by: INTERNAL MEDICINE

## 2025-03-09 PROCEDURE — 95816 EEG AWAKE AND DROWSY: CPT | Performed by: PSYCHIATRY & NEUROLOGY

## 2025-03-09 PROCEDURE — G0378 HOSPITAL OBSERVATION PER HR: HCPCS

## 2025-03-09 PROCEDURE — 95714 VEEG EA 12-26 HR UNMNTR: CPT

## 2025-03-09 PROCEDURE — 93005 ELECTROCARDIOGRAM TRACING: CPT | Performed by: INTERNAL MEDICINE

## 2025-03-09 PROCEDURE — 99204 OFFICE O/P NEW MOD 45 MIN: CPT | Performed by: PSYCHIATRY & NEUROLOGY

## 2025-03-09 PROCEDURE — 84132 ASSAY OF SERUM POTASSIUM: CPT | Performed by: STUDENT IN AN ORGANIZED HEALTH CARE EDUCATION/TRAINING PROGRAM

## 2025-03-09 PROCEDURE — 36415 COLL VENOUS BLD VENIPUNCTURE: CPT | Performed by: INTERNAL MEDICINE

## 2025-03-09 PROCEDURE — 85025 COMPLETE CBC W/AUTO DIFF WBC: CPT | Performed by: INTERNAL MEDICINE

## 2025-03-09 PROCEDURE — 93010 ELECTROCARDIOGRAM REPORT: CPT | Performed by: INTERNAL MEDICINE

## 2025-03-09 PROCEDURE — 99204 OFFICE O/P NEW MOD 45 MIN: CPT | Performed by: INTERNAL MEDICINE

## 2025-03-09 PROCEDURE — 95816 EEG AWAKE AND DROWSY: CPT

## 2025-03-09 PROCEDURE — 82746 ASSAY OF FOLIC ACID SERUM: CPT | Performed by: INTERNAL MEDICINE

## 2025-03-09 PROCEDURE — 82607 VITAMIN B-12: CPT | Performed by: INTERNAL MEDICINE

## 2025-03-09 PROCEDURE — 25010000002 POTASSIUM CHLORIDE 10 MEQ/100ML SOLUTION: Performed by: STUDENT IN AN ORGANIZED HEALTH CARE EDUCATION/TRAINING PROGRAM

## 2025-03-09 RX ORDER — POTASSIUM CHLORIDE 1500 MG/1
40 TABLET, EXTENDED RELEASE ORAL ONCE
Status: COMPLETED | OUTPATIENT
Start: 2025-03-09 | End: 2025-03-09

## 2025-03-09 RX ORDER — PRAZOSIN HYDROCHLORIDE 2 MG/1
2 CAPSULE ORAL 2 TIMES DAILY
Status: DISCONTINUED | OUTPATIENT
Start: 2025-03-09 | End: 2025-03-12 | Stop reason: HOSPADM

## 2025-03-09 RX ORDER — NICOTINE 21 MG/24HR
1 PATCH, TRANSDERMAL 24 HOURS TRANSDERMAL
Status: DISCONTINUED | OUTPATIENT
Start: 2025-03-09 | End: 2025-03-12 | Stop reason: HOSPADM

## 2025-03-09 RX ORDER — POTASSIUM CHLORIDE 1500 MG/1
40 TABLET, EXTENDED RELEASE ORAL EVERY 4 HOURS
Status: DISCONTINUED | OUTPATIENT
Start: 2025-03-09 | End: 2025-03-09

## 2025-03-09 RX ORDER — DEXTROAMPHETAMINE SACCHARATE, AMPHETAMINE ASPARTATE, DEXTROAMPHETAMINE SULFATE AND AMPHETAMINE SULFATE 5; 5; 5; 5 MG/1; MG/1; MG/1; MG/1
20 TABLET ORAL DAILY
COMMUNITY

## 2025-03-09 RX ORDER — POTASSIUM CHLORIDE 1500 MG/1
40 TABLET, EXTENDED RELEASE ORAL EVERY 4 HOURS
Status: COMPLETED | OUTPATIENT
Start: 2025-03-09 | End: 2025-03-10

## 2025-03-09 RX ORDER — VENLAFAXINE HYDROCHLORIDE 75 MG/1
225 CAPSULE, EXTENDED RELEASE ORAL
Status: DISCONTINUED | OUTPATIENT
Start: 2025-03-10 | End: 2025-03-12 | Stop reason: HOSPADM

## 2025-03-09 RX ORDER — POTASSIUM CHLORIDE 7.45 MG/ML
10 INJECTION INTRAVENOUS
Status: DISCONTINUED | OUTPATIENT
Start: 2025-03-09 | End: 2025-03-09

## 2025-03-09 RX ADMIN — SENNOSIDES AND DOCUSATE SODIUM 2 TABLET: 50; 8.6 TABLET ORAL at 03:38

## 2025-03-09 RX ADMIN — NICOTINE 1 PATCH: 14 PATCH TRANSDERMAL at 05:50

## 2025-03-09 RX ADMIN — POTASSIUM CHLORIDE 40 MEQ: 1500 TABLET, EXTENDED RELEASE ORAL at 03:38

## 2025-03-09 RX ADMIN — POTASSIUM CHLORIDE 40 MEQ: 1500 TABLET, EXTENDED RELEASE ORAL at 05:50

## 2025-03-09 RX ADMIN — LAMOTRIGINE 150 MG: 100 TABLET ORAL at 18:25

## 2025-03-09 RX ADMIN — SODIUM CHLORIDE 100 ML/HR: 9 INJECTION, SOLUTION INTRAVENOUS at 03:42

## 2025-03-09 RX ADMIN — Medication 10 ML: at 08:50

## 2025-03-09 RX ADMIN — CARIPRAZINE 6 MG: 3 CAPSULE, GELATIN COATED ORAL at 18:17

## 2025-03-09 RX ADMIN — Medication 10 ML: at 03:30

## 2025-03-09 RX ADMIN — POTASSIUM CHLORIDE 40 MEQ: 1500 TABLET, EXTENDED RELEASE ORAL at 15:54

## 2025-03-09 RX ADMIN — POTASSIUM CHLORIDE 40 MEQ: 1500 TABLET, EXTENDED RELEASE ORAL at 21:02

## 2025-03-09 NOTE — ED PROVIDER NOTES
EMERGENCY DEPARTMENT ENCOUNTER    History  Chief Complaint   Patient presents with    Weakness - Generalized       History provided by: Patient and Significant Other    HPI:  Chief Complaint: fatigue, syncope     Context: Pt is a 34 y.o. female who presents with complaints of acute fatigue and syncope.  Symptoms been present for the past several days.  She notes that she is sleeping about 20 hours a day, which is very atypical for her.  She just feels fatigued like she cannot really get up and move around.  She notes going to an outside hospital twice in the past week.  There, she was told she was dehydrated, but was eventually discharged.  When she was discharged, she says she had a sleep in her car for several hours before she [place] could drive home.  She said she had to stop at a business and also sleep on her drive home secondary to her severe symptoms.  She notes that whenever she goes from a lying to standing position she has lightheadedness and loses consciousness.  She had an episode where she got up from bed, and ended up on the floor on a different level of the home.    She notes no recent travel.  No sick exposure.  Has not had any fever, cough or congestion.  No rashes or wounds.  She denies any chronic medical problems.  Does note some mental health disorders, ADHD, anxiety, PTSD.  She takes prazosin, Wellbutrin, Effexor, Lamictal and Adderall.  She has not taken any of her medicines given her symptoms over the past week.  No changes in mood, no stressful events over the past few weeks.      Active Ambulatory Problems     Diagnosis Date Noted    No Active Ambulatory Problems     Resolved Ambulatory Problems     Diagnosis Date Noted    No Resolved Ambulatory Problems     Past Medical History:   Diagnosis Date    ADD (attention deficit disorder)     Anxiety     Bipolar disorder     Depression     Night terrors     PTSD (post-traumatic stress disorder)        Past Surgical History:   Procedure Laterality  Date    APPENDECTOMY      CHOLECYSTECTOMY      TUBAL ABDOMINAL LIGATION         Physical Exam  ED Triage Vitals   Temp Heart Rate Resp BP SpO2   03/08/25 1952 03/08/25 1952 03/08/25 1952 03/08/25 1954 03/08/25 1952   98.1 °F (36.7 °C) 117 16 121/85 98 %      Temp src Heart Rate Source Patient Position BP Location FiO2 (%)   03/08/25 1952 03/08/25 1952 03/08/25 1954 03/08/25 1954 --   Tympanic Monitor Sitting Right arm      Physical Exam  Constitutional:       Appearance: Normal appearance.   HENT:      Head: Normocephalic and atraumatic.   Eyes:      Pupils: Pupils are equal, round, and reactive to light.   Cardiovascular:      Rate and Rhythm: Normal rate and regular rhythm.      Heart sounds: No murmur heard.  Abdominal:      Tenderness: There is no abdominal tenderness. There is no guarding or rebound.   Skin:     General: Skin is warm.   Neurological:      Mental Status: She is alert and oriented to person, place, and time.      Cranial Nerves: No cranial nerve deficit.      Sensory: No sensory deficit.      Motor: No weakness.   Psychiatric:         Mood and Affect: Mood normal.         Medical Decision Making:    Differential Diagnosis includes but not limited to: Arrhythmia, electrolyte disturbance, dehydration, viral illness    Alternative Historian Required Due To: Contributes additional history    Medical Record Review: Reviewed visit to [facility], 3/6/2025, patient did have labs including TSH, CT head which was unremarkable    Labs Results:  Recent Results (from the past 24 hours)   Comprehensive Metabolic Panel    Collection Time: 03/08/25  8:22 PM    Specimen: Blood   Result Value Ref Range    Glucose 111 (H) 65 - 99 mg/dL    BUN 17 6 - 20 mg/dL    Creatinine 1.12 (H) 0.57 - 1.00 mg/dL    Sodium 136 136 - 145 mmol/L    Potassium 3.1 (L) 3.5 - 5.2 mmol/L    Chloride 93 (L) 98 - 107 mmol/L    CO2 28.0 22.0 - 29.0 mmol/L    Calcium 9.3 8.6 - 10.5 mg/dL    Total Protein 7.8 6.0 - 8.5 g/dL     Albumin 4.3 3.5 - 5.2 g/dL    ALT (SGPT) 16 1 - 33 U/L    AST (SGOT) 24 1 - 32 U/L    Alkaline Phosphatase 89 39 - 117 U/L    Total Bilirubin 0.2 0.0 - 1.2 mg/dL    Globulin 3.5 gm/dL    A/G Ratio 1.2 g/dL    BUN/Creatinine Ratio 15.2 7.0 - 25.0    Anion Gap 15.0 5.0 - 15.0 mmol/L    eGFR 65.5 >60.0 mL/min/1.73   CK    Collection Time: 03/08/25  8:22 PM    Specimen: Blood   Result Value Ref Range    Creatine Kinase 71 20 - 180 U/L   Magnesium    Collection Time: 03/08/25  8:22 PM    Specimen: Blood   Result Value Ref Range    Magnesium 2.3 1.6 - 2.6 mg/dL   Phosphorus    Collection Time: 03/08/25  8:22 PM    Specimen: Blood   Result Value Ref Range    Phosphorus 3.7 2.5 - 4.5 mg/dL   CBC Auto Differential    Collection Time: 03/08/25  8:22 PM    Specimen: Blood   Result Value Ref Range    WBC 7.82 3.40 - 10.80 10*3/mm3    RBC 4.86 3.77 - 5.28 10*6/mm3    Hemoglobin 15.3 12.0 - 15.9 g/dL    Hematocrit 44.7 34.0 - 46.6 %    MCV 92.0 79.0 - 97.0 fL    MCH 31.5 26.6 - 33.0 pg    MCHC 34.2 31.5 - 35.7 g/dL    RDW 11.8 (L) 12.3 - 15.4 %    RDW-SD 40.1 37.0 - 54.0 fl    MPV 9.3 6.0 - 12.0 fL    Platelets 478 (H) 140 - 450 10*3/mm3    Neutrophil % 57.5 42.7 - 76.0 %    Lymphocyte % 32.0 19.6 - 45.3 %    Monocyte % 9.0 5.0 - 12.0 %    Eosinophil % 0.4 0.3 - 6.2 %    Basophil % 0.8 0.0 - 1.5 %    Immature Grans % 0.3 0.0 - 0.5 %    Neutrophils, Absolute 4.51 1.70 - 7.00 10*3/mm3    Lymphocytes, Absolute 2.50 0.70 - 3.10 10*3/mm3    Monocytes, Absolute 0.70 0.10 - 0.90 10*3/mm3    Eosinophils, Absolute 0.03 0.00 - 0.40 10*3/mm3    Basophils, Absolute 0.06 0.00 - 0.20 10*3/mm3    Immature Grans, Absolute 0.02 0.00 - 0.05 10*3/mm3    nRBC 0.0 0.0 - 0.2 /100 WBC   [place] Level    Collection Time: 03/08/25  8:22 PM    Specimen: Blood   Result Value Ref Range    Lithium <0.1 (L) 0.6 - 1.2 mmol/L   Respiratory Panel PCR w/COVID-19(SARS-CoV-2) JOAQUIN/NORA/ZI/PAD/COR/ASIM In-House, NP Swab in UTM/VTM, 2 HR TAT - Swab, Nasopharynx     Collection Time: 03/08/25  8:23 PM    Specimen: Nasopharynx; Swab   Result Value Ref Range    ADENOVIRUS, PCR Not Detected Not Detected    Coronavirus 229E Not Detected Not Detected    Coronavirus HKU1 Not Detected Not Detected    Coronavirus NL63 Not Detected Not Detected    Coronavirus OC43 Not Detected Not Detected    COVID19 Not Detected Not Detected - Ref. Range    Human Metapneumovirus Not Detected Not Detected    Human Rhinovirus/Enterovirus Not Detected Not Detected    Influenza A PCR Not Detected Not Detected    Influenza B PCR Not Detected Not Detected    Parainfluenza Virus 1 Not Detected Not Detected    Parainfluenza Virus 2 Not Detected Not Detected    Parainfluenza Virus 3 Not Detected Not Detected    Parainfluenza Virus 4 Not Detected Not Detected    RSV, PCR Not Detected Not Detected    Bordetella pertussis pcr Not Detected Not Detected    Bordetella parapertussis PCR Not Detected Not Detected    Chlamydophila pneumoniae PCR Not Detected Not Detected    Mycoplasma pneumo by PCR Not Detected Not Detected   ECG 12 Lead Syncope    Collection Time: 03/08/25  8:24 PM   Result Value Ref Range    QT Interval 480 ms    QTC Interval 608 ms     Lab Comments:  My independent interpretation of the above labs: Benign      Radiology:  XR Chest 1 View  Result Date: 3/8/2025  CXR ONE VIEW  HISTORY: syncope  COMPARISON: None  TECHNIQUE: single portable AP       The heart size is within normal limits.  The lungs are normally aerated. There is no pleural effusion or pneumothorax.    This report was finalized on 3/8/2025 8:58 PM by Dr. Mian Mcgowan M.D on Workstation: [id number]      Radiology Comments:  I ordered the above imaging and reviewed the results.    My independent interpretation of the chest x-ray: No acute process    EKG Interpreted by me: Sinus rhythm, long QTc, 608, no previous for comparison      Medications given in ED:  Medications   lactated ringers bolus 1,000 mL (1,000 mL Intravenous New Bag  3/8/25 2026)   magnesium sulfate 2g/50 mL (PREMIX) infusion (2 g Intravenous New Bag 3/8/25 2131)         Rationale:  This is an overall well-appearing 34-year-old female who is presenting today secondary to complaints of severe fatigue and multiple syncopal episodes.  At time of my evaluation, she overall looks well.  She presents afebrile with benign vital signs.  She did not have orthostatic hypotension, was tachycardic upon standing.    I reviewed her recent visits to the [facility] system.  There, she was evaluated with labs including TSH, UA, UDS, COVID/flu/RSV test.  She did have a CT scan of the head that was unremarkable.  I do not see the physical EKG that was performed, but the QTc is documented at 432.    Given the complaints of syncope, she was reevaluated with an EKG here.  Was notable for a long QT, 608.  She did receive a dose of Compazine in the emergency department at the outside facility, but reports taking no medications currently at home.  Will give a dose of magnesium and closely watch on telemetry.  While this could explain her syncopal episodes, and really does not explain her significant fatigue.    She was further evaluated with labs, which are reviewed and fairly benign.  Will need to monitor on telemetry, will plan on admission for further care and management.    Progress Notes:  9:30 AM: I spoke with the patient about her ED work up and diagnosis. I informed the patient that she will be admitted for further evaluation/treatment. All questions answered.      Consult:  9:51 PM EST: Discussed case with Dr. Patton [facility].  Reviewed history, exam, results and treatments.  Will admit         Diagnosis:  Final diagnoses:   Long QT interval   Syncope and collapse   Profound fatigue       Parts of this note may be an electronic transcription/translation of spoken language to printed text using the Dragon dictation system        Provider Note Signed by:     Yolie Arteaga MD  03/08/25  4443

## 2025-03-09 NOTE — ED NOTES
pt arrives to triage desk. c/o lethargy, syncope, generalized weakness, headache x 5 days. Pt denies N/V/D.

## 2025-03-09 NOTE — ED NOTES
Nursing report ED to floor  Francisco Frye  34 y.o.  female    HPI :  HPI  Stated Reason for Visit: Pt reports feeling weak since Tuesday night. She reports that she has been excessively sleepy, and sleeping for 22-24 hours at a time. And has been seen in the ER x2 this week for the same symptoms.  History Obtained From: patient  Duration (Days): 5    Chief Complaint  Chief Complaint   Patient presents with    Weakness - Generalized       Admitting doctor:   Kyle Patton MD    Admitting diagnosis:   The primary encounter diagnosis was Long QT interval. Diagnoses of Syncope and collapse and Profound fatigue were also pertinent to this visit.    Code status:   Current Code Status       Date Active Code Status Order ID Comments User Context       Not on file            Allergies:   Nsaids    Isolation:   No active isolations    Intake and Output  No intake or output data in the 24 hours ending 03/08/25 2157    Weight:       03/08/25 1952   Weight: 68 kg (150 lb)       Most recent vitals:   Vitals:    03/08/25 1954 03/08/25 2046 03/08/25 2047 03/08/25 2049   BP: 121/85 106/72 110/77 120/48   BP Location: Right arm      Patient Position: Sitting Lying Sitting Standing   Pulse:  96 104 110   Resp:       Temp:       TempSrc:       SpO2:       Weight:       Height:           Active LDAs/IV Access:   Lines, Drains & Airways       Active LDAs       Name Placement date Placement time Site Days    Peripheral IV 03/08/25 2021 Anterior;Right Forearm 03/08/25 2021  Forearm  less than 1                    Labs (abnormal labs have a star):   Labs Reviewed   COMPREHENSIVE METABOLIC PANEL - Abnormal; Notable for the following components:       Result Value    Glucose 111 (*)     Creatinine 1.12 (*)     Potassium 3.1 (*)     Chloride 93 (*)     All other components within normal limits    Narrative:     GFR Categories in Chronic Kidney Disease (CKD)      GFR Category          GFR (mL/min/1.73)    Interpretation  G1                      90 or greater         Normal or high (1)  G2                      60-89                Mild decrease (1)  G3a                   45-59                Mild to moderate decrease  G3b                   30-44                Moderate to severe decrease  G4                    15-29                Severe decrease  G5                    14 or less           Kidney failure          (1)In the absence of evidence of kidney disease, neither GFR category G1 or G2 fulfill the criteria for CKD.    eGFR calculation 2021 CKD-EPI creatinine equation, which does not include race as a factor   CBC WITH AUTO DIFFERENTIAL - Abnormal; Notable for the following components:    RDW 11.8 (*)     Platelets 478 (*)     All other components within normal limits   LITHIUM LEVEL - Abnormal; Notable for the following components:    Lithium <0.1 (*)     All other components within normal limits   RESPIRATORY PANEL PCR W/ COVID-19 (SARS-COV-2), NP SWAB IN UTM/VTP, 2 HR TAT - Normal    Narrative:     In the setting of a positive respiratory panel with a viral infection PLUS a negative procalcitonin without other underlying concern for bacterial infection, consider observing off antibiotics or discontinuation of antibiotics and continue supportive care. If the respiratory panel is positive for atypical bacterial infection (Bordetella pertussis, Chlamydophila pneumoniae, or Mycoplasma pneumoniae), consider antibiotic de-escalation to target atypical bacterial infection.   CK - Normal   MAGNESIUM - Normal   PHOSPHORUS - Normal   URINALYSIS W/ MICROSCOPIC IF INDICATED (NO CULTURE)   HCG, SERUM, QUALITATIVE   LAMOTRIGINE LEVEL   CBC AND DIFFERENTIAL    Narrative:     The following orders were created for panel order CBC & Differential.  Procedure                               Abnormality         Status                     ---------                               -----------         ------                     CBC Auto Differential[[id number]]         Abnormal            Final result                 Please view results for these tests on the individual orders.       EKG:   ECG 12 Lead Syncope   Preliminary Result   HEART RATE=96  bpm   RR Hqiiayqp=395  ms   MS Nawirxnk=606  ms   P Horizontal Axis=32  deg   P Front Axis=55  deg   QRSD Interval=92  ms   QT Vxcyhxqu=227  ms   WRhO=336  ms   QRS Axis=102  deg   T Wave Axis=45  deg   - ABNORMAL ECG -   Sinus rhythm   Borderline right axis deviation   Prolonged QT interval   Date and Time of Study:2025-03-08 20:24:31          Meds given in ED:   Medications   lactated ringers bolus 1,000 mL (1,000 mL Intravenous New Bag 3/8/25 2026)   magnesium sulfate 2g/50 mL (PREMIX) infusion (2 g Intravenous New Bag 3/8/25 2131)       Imaging results:  XR Chest 1 View  Result Date: 3/8/2025   The heart size is within normal limits.  The lungs are normally aerated. There is no pleural effusion or pneumothorax.    This report was finalized on 3/8/2025 8:58 PM by Dr. Mian Mcgowan M.D on Workstation: [id number]        Ambulatory status:   - Standby    Social issues:   Social History     Socioeconomic History    Marital status:    Tobacco Use    Smoking status: Every Day    Smokeless tobacco: Never    Tobacco comments:     Smokes 1 cigarette per day   Vaping Use    Vaping status: Never Used   Substance and Sexual Activity    Alcohol use: No    Drug use: No    Sexual activity: Defer       Peripheral Neurovascular  Peripheral Neurovascular (Adult)  Peripheral Neurovascular WDL: WDL    Neuro Cognitive  Neuro Cognitive (Adult)  Cognitive/Neuro/Behavioral WDL: .WDL except, orientation, level of consciousness, arousability  Arousal Level: arouses to pain, arouses to repeated stimulation  Orientation: oriented x 4    Learning  Learning Assessment  Learning Readiness and Ability: no barriers identified  Education Provided  Person Taught: patient  Teaching Method: verbal instruction  Teaching Focus: symptom/problem  overview  Education Outcome Evaluation: eager to learn    Respiratory  Respiratory WDL  Respiratory WDL: WDL    Abdominal Pain       Pain Assessments  Pain (Adult)  (0-10) Pain Rating: Rest: 8  Pain Location: head    NIH Stroke Scale       Elizabeth Peña RN  03/08/25 21:57 EST

## 2025-03-09 NOTE — NURSING NOTE
[place] [facility] stated she was feeling very weak and like she was about to pass out. Pt appeared to be very pale. Assisted patient back to bed and she went unresponsive for approx 20 seconds, BLE became very rigid and tremor like. Pt was then able to open eyes and with delayed responses/postictal for approx 1-2 minutes; she was alert and oriented x4. VSS; remained NSR on tele. Placed on 2L NC and purewick placed for bedrest at this time. Dr. Arzate on unit and aware. Seizure precautions remain in place.

## 2025-03-09 NOTE — PROGRESS NOTES
Name: Francisco Frye ADMIT: 3/8/2025   : 1990  PCP: Provider, No Known    MRN: [id number] LOS: 0 days   AGE/SEX: 34 y.o. female  ROOM: [facility]     Subjective   Subjective   Resting in bed, no apparent distress.  Going to get 24-hour EEG started this afternoon, patient with seizure-like episode this morning with several minutes of confusion/delayed responses after episode.  Paged by RN later this afternoon who reports second episode while EEG going.       Objective   Objective   Vital Signs  Temp:  [97.6 °F (36.4 °C)-98.2 °F (36.8 °C)] 98.1 °F (36.7 °C)  Heart Rate:  [] 85  Resp:  [16-18] 18  BP: ()/(48-85) 106/64  SpO2:  [95 %-99 %] 99 %  on   ;   Device (Oxygen Therapy): room air  Body mass index is 28.33 kg/m².  Physical Exam  Constitutional:       General: She is not in acute distress.     Appearance: Normal appearance. She is normal weight. She is not ill-appearing.   HENT:      Mouth/Throat:      Mouth: Mucous membranes are moist.      Pharynx: Oropharynx is clear.   Eyes:      Extraocular Movements: Extraocular movements intact.      Conjunctiva/sclera: Conjunctivae normal.      Pupils: Pupils are equal, round, and reactive to light.   Cardiovascular:      Rate and Rhythm: Normal rate and regular rhythm.   Pulmonary:      Effort: Pulmonary effort is normal. No respiratory distress.      Breath sounds: Normal breath sounds. No wheezing.   Abdominal:      General: Abdomen is flat.      Palpations: Abdomen is soft.      Tenderness: There is no abdominal tenderness.   Musculoskeletal:         General: No swelling or tenderness. Normal range of motion.      Right lower leg: No edema.      Left lower leg: No edema.   Skin:     General: Skin is warm and dry.   Neurological:      General: No focal deficit present.      Mental Status: She is alert and oriented to person, place, and time. Mental status is at baseline.   Psychiatric:         Mood and Affect: Mood normal.         Behavior: Behavior  "normal.         Thought Content: Thought content normal.         Judgment: Judgment normal.         Results Review     I reviewed the patient's new clinical results.  Results from last 7 days   Lab Units 03/09/25  0551 03/08/25 2022   WBC 10*3/mm3 6.88 7.82   HEMOGLOBIN g/dL 13.9 15.3   PLATELETS 10*3/mm3 453* 478*     Results from last 7 days   Lab Units 03/09/25  0551 03/08/25 2022   SODIUM mmol/L 135* 136   POTASSIUM mmol/L 2.6* 3.1*   CHLORIDE mmol/L 95* 93*   CO2 mmol/L 28.0 28.0   BUN mg/dL 18 17   CREATININE mg/dL 0.92 1.12*   GLUCOSE mg/dL 118* 111*   Estimated Creatinine Clearance: 74.6 mL/min (by C-G formula based on SCr of 0.92 mg/dL).  Results from last 7 days   Lab Units 03/08/25 2022   ALBUMIN g/dL 4.3   BILIRUBIN mg/dL 0.2   ALK PHOS U/L 89   AST (SGOT) U/L 24   ALT (SGPT) U/L 16     Results from last 7 days   Lab Units 03/09/25  0551 03/08/25 2022   CALCIUM mg/dL 8.9 9.3   ALBUMIN g/dL  --  4.3   MAGNESIUM mg/dL  --  2.3   PHOSPHORUS mg/dL  --  3.7       COVID19   Date Value Ref Range Status   03/08/2025 Not Detected Not Detected - Ref. Range Final     SARS-CoV-2, YUAN   Date Value Ref Range Status   01/22/2024 NEGATIVE Negative Final     Comment:     The 2019-CoV rRT-PCR Assay is only for use under a Food and Drug Administration Emergency Use Authorization. The performance characteristics of the assay were verified by the Clinical Laboratory at [facility]. Results should be used in   conjunction with the patient's clinical symptoms, medical history, and other clinical/laboratory findings to determine an overall clinical diagnosis. Negative results do not preclude infection with SARS-CoV-2 (COVID-19).    Test parameters have not been validated for screening asymptomatic patients.     No results found for: \"HGBA1C\", \"POCGLU\"    EEG  Date of onset: 3/9/2025  Date of offset: 3/9/2025     Indication: 34 year old woman with seizure like spells.       Technical description:  This is a " 21 channel digital EEG recording that   began on 1424 and ended on 1450.  Electrodes were placed based on the   10-20 international electrode placement system.       Background:  The EEG recording demonstrates normal background activity   with mainly alpha frequencies with intermixed theta frequencies.  9 Hz   frequencies are present posteriorly and symmetrically.  The patient enters   drowsiness only.  Hyperventilation was not performed but photic   stimulation was performed with no additional abnormalities noted.  There   are no asymmetries between the two hemispheres.  No interictal activity is   present. Patient is stood up and she wanted to lay down and when she was   in bed she was not responsive for brief period of time during which time   her EEG demonstrated normal cerebral activity.       The EKG monitor shows a heart rate that is around 95 beats per minute.     Clinical interpretation:  This routine awake and drowsy EEG is normal.  No   potentially epileptogenic activity, seizure activity, or focal slowing is   present.  The spell captured does not appear to be epileptic in etiology.    Findings were discussed with neuro-hospitalist.  Careful clinical   correlation is advised.       Scheduled Medications  nicotine, 1 patch, Transdermal, Q24H  potassium chloride, 40 mEq, Oral, Once  senna-docusate sodium, 2 tablet, Oral, BID  sodium chloride, 10 mL, Intravenous, Q12H    Infusions  sodium chloride, 100 mL/hr, Last Rate: 100 mL/hr (03/09/25 0712)    Diet  Diet: Regular/House; Fluid Consistency: Thin (IDDSI 0)         I have personally reviewed:  [x]  Laboratory   []  Microbiology   []  Radiology   [x]  EKG/Telemetry   []  Cardiology/Vascular   []  Pathology   []  Records    Assessment/Plan     Active Hospital Problems    Diagnosis  POA    **Syncope [R55]  Yes    Bipolar 1 disorder [F31.9]  Unknown    ADD (attention deficit disorder) [F98.8]  Unknown    Anxiety disorder [F41.9]  Unknown    Hypokalemia [E87.6]   Unknown    Prolonged QT interval [R94.31]  Unknown    Lethargy [R53.83]  Unknown      Resolved Hospital Problems   No resolved problems to display.       34 y.o. female admitted with Syncope.    Syncope  Questionable seizure  - differential includes POTS, PNES, epilepsy- new  - neuro consulted, recommending MRI and EEG    Hypersomnia  - d/w patient plan for ambulatory referral to sleep medicine, order placed  - pt reports holding her psych meds the last 7 days - hypersomnia began prior to holding meds    Hypokalemia  - replace per protocol and recheck labs    Bipolar disorder/PTSD  - resume home psych meds- vraylar, lamictal, prazosin, effexor  - hold home wellbutrin- can lower seizure threshold      SCDs for DVT prophylaxis.  Full code.  Discussed with patient and nursing staff.  Anticipated discharge home, TBD        Viktoria Noble MD  [facility]  03/09/25  15:32 EDT    I wore protective equipment throughout this patient encounter including gloves.  Hand hygiene was performed before donning protective equipment and after removal when leaving the room.         Copied text in this note has been reviewed and is accurate as of 03/09/25.

## 2025-03-09 NOTE — PROGRESS NOTES
Pharmacy Consult to review meds for QTc prolonging agents.    Pharmacy reviewed home medication list and hospital medications and none of them are agents typically associated with QTc prolongation.    Thank you for involving pharmacy in this patients care.    Fox Esposito, ShaunD

## 2025-03-09 NOTE — CONSULTS
"Neurology Consult Note    Consult Date: 3/9/2025    Referring MD: Kyle Patton*    Reason for Consult I have been asked to see the patient in neurological consultation to render advice and opinion regarding seizure, hypersomnia    Francisco Frye is a 34 y.o. female with reported history of bipolar, ADHD, PTSD.  She reports no recent exacerbations of her psychiatric symptoms and no changes to her medications.    She complains that for the past week or so she has had persistent hypersomnia, sleeping up to 20 hours/day.  In the setting of that she endorses orthostatic lightheadedness and seizure-like spells.    In the emergency department she was not orthostatic but did have some tachycardia upon standing.    She had a similar episode prior to my initial evaluation today.  Her nurse reports that she stood up and complained of feeling lightheaded and then was laid back down.  She had some brief generalized clonic movements of her legs.  Blood pressure was normal when checked.  She rapidly returned to her neurologic baseline.    She reports a family history of epilepsy in her maternal aunt    Past Medical History:   Diagnosis Date    ADD (attention deficit disorder)     Anxiety     Bipolar disorder     type I    Depression     Night terrors     PTSD (post-traumatic stress disorder)        Exam  /75   Pulse 93   Temp 98.2 °F (36.8 °C) (Oral)   Resp 18   Ht 154.9 cm (61\")   Wt 68 kg (149 lb 14.6 oz)   LMP  (LMP Unknown)   SpO2 98%   BMI 28.33 kg/m²   Gen: NAD, vitals reviewed  MS: oriented x3, recent/remote memory intact, normal attention/concentration, language intact, no neglect.  CN: visual acuity grossly normal, PERRL, EOMI, no facial droop, no dysarthria  Motor: 5/5 throughout upper and lower extremities, normal tone    DATA:    Lab Results   Component Value Date    GLUCOSE 118 (H) 03/09/2025    CALCIUM 8.9 03/09/2025     (L) 03/09/2025    K 2.6 (C) 03/09/2025    CO2 28.0 03/09/2025    " CL 95 (L) 03/09/2025    BUN 18 03/09/2025    CREATININE 0.92 03/09/2025    BCR 19.6 03/09/2025    ANIONGAP 12.0 03/09/2025     Lab Results   Component Value Date    WBC 6.88 03/09/2025    HGB 13.9 03/09/2025    HCT 40.6 03/09/2025    MCV 91.6 03/09/2025     (H) 03/09/2025       Lab review: CBC, BMP reviewed, potassium 2.6    Imaging review: MRI brain with and without contrast ordered for tomorrow    24-hour EEG ordered    Diagnoses:  Seizure-like spells  Orthostatic dizziness and tachycardia  Hypersomnia  Posttraumatic stress disorder  Bipolar disorder    Comment: Differential diagnosis includes postural orthostatic tachycardia syndrome, psychogenic nonepileptic seizures, new onset focal epilepsy.  The correlation of her hypersomnia is unclear but that may need further evaluation with sleep medicine in the outpatient setting.    PLAN:  24-hour EEG.  I requested that the tech stand her up for part of the study to attempt to provoke a spell  Contrasted brain MRI tomorrow after EEG  Likely sleep referral at discharge for hypersomnia    Management discussed with patient, nursing staff.

## 2025-03-09 NOTE — ED NOTES
Nursing report ED to floor  Francisco Frye  34 y.o.  female    HPI :  HPI  Stated Reason for Visit: Pt reports feeling weak since Tuesday night. She reports that she has been excessively sleepy, and sleeping for 22-24 hours at a time. And has been seen in the ER x2 this week for the same symptoms.  History Obtained From: patient  Duration (Days): 5    Chief Complaint  Chief Complaint   Patient presents with    Weakness - Generalized       Admitting doctor:   Kyle Patton MD    Admitting diagnosis:   The primary encounter diagnosis was Long QT interval. Diagnoses of Syncope and collapse and Profound fatigue were also pertinent to this visit.    Code status:   Current Code Status       Date Active Code Status Order ID Comments User Context       Not on file            Allergies:   Nsaids    Isolation:   No active isolations    Intake and Output  No intake or output data in the 24 hours ending 03/08/25 2213    Weight:       03/08/25 1952   Weight: 68 kg (150 lb)       Most recent vitals:   Vitals:    03/08/25 1954 03/08/25 2046 03/08/25 2047 03/08/25 2049   BP: 121/85 106/72 110/77 120/48   BP Location: Right arm      Patient Position: Sitting Lying Sitting Standing   Pulse:  96 104 110   Resp:       Temp:       TempSrc:       SpO2:       Weight:       Height:           Active LDAs/IV Access:   Lines, Drains & Airways       Active LDAs       Name Placement date Placement time Site Days    Peripheral IV 03/08/25 2021 Anterior;Right Forearm 03/08/25 2021  Forearm  less than 1                    Labs (abnormal labs have a star):   Labs Reviewed   COMPREHENSIVE METABOLIC PANEL - Abnormal; Notable for the following components:       Result Value    Glucose 111 (*)     Creatinine 1.12 (*)     Potassium 3.1 (*)     Chloride 93 (*)     All other components within normal limits    Narrative:     GFR Categories in Chronic Kidney Disease (CKD)      GFR Category          GFR (mL/min/1.73)    Interpretation  G1                      90 or greater         Normal or high (1)  G2                      60-89                Mild decrease (1)  G3a                   45-59                Mild to moderate decrease  G3b                   30-44                Moderate to severe decrease  G4                    15-29                Severe decrease  G5                    14 or less           Kidney failure          (1)In the absence of evidence of kidney disease, neither GFR category G1 or G2 fulfill the criteria for CKD.    eGFR calculation 2021 CKD-EPI creatinine equation, which does not include race as a factor   CBC WITH AUTO DIFFERENTIAL - Abnormal; Notable for the following components:    RDW 11.8 (*)     Platelets 478 (*)     All other components within normal limits   LITHIUM LEVEL - Abnormal; Notable for the following components:    Lithium <0.1 (*)     All other components within normal limits   RESPIRATORY PANEL PCR W/ COVID-19 (SARS-COV-2), NP SWAB IN UTM/VTP, 2 HR TAT - Normal    Narrative:     In the setting of a positive respiratory panel with a viral infection PLUS a negative procalcitonin without other underlying concern for bacterial infection, consider observing off antibiotics or discontinuation of antibiotics and continue supportive care. If the respiratory panel is positive for atypical bacterial infection (Bordetella pertussis, Chlamydophila pneumoniae, or Mycoplasma pneumoniae), consider antibiotic de-escalation to target atypical bacterial infection.   HCG, SERUM, QUALITATIVE - Normal   CK - Normal   MAGNESIUM - Normal   PHOSPHORUS - Normal   URINALYSIS W/ MICROSCOPIC IF INDICATED (NO CULTURE)   LAMOTRIGINE LEVEL   CBC AND DIFFERENTIAL    Narrative:     The following orders were created for panel order CBC & Differential.  Procedure                               Abnormality         Status                     ---------                               -----------         ------                     CBC Auto  Differential[[id number]]        Abnormal            Final result                 Please view results for these tests on the individual orders.       EKG:   ECG 12 Lead Syncope   Preliminary Result   HEART RATE=96  bpm   RR Ckrhtksg=603  ms   DE Psgdjwnu=086  ms   P Horizontal Axis=32  deg   P Front Axis=55  deg   QRSD Interval=92  ms   QT Ncsdqnmv=780  ms   NFpX=269  ms   QRS Axis=102  deg   T Wave Axis=45  deg   - ABNORMAL ECG -   Sinus rhythm   Borderline right axis deviation   Prolonged QT interval   Date and Time of Study:2025-03-08 20:24:31          Meds given in ED:   Medications   lactated ringers bolus 1,000 mL (1,000 mL Intravenous New Bag 3/8/25 2026)   magnesium sulfate 2g/50 mL (PREMIX) infusion (2 g Intravenous New Bag 3/8/25 2131)       Imaging results:  XR Chest 1 View  Result Date: 3/8/2025   The heart size is within normal limits.  The lungs are normally aerated. There is no pleural effusion or pneumothorax.    This report was finalized on 3/8/2025 8:58 PM by Dr. Mian Mcgowan M.D on Workstation: [id number]        Ambulatory status:   - X1     Social issues:   Social History     Socioeconomic History    Marital status:    Tobacco Use    Smoking status: Every Day    Smokeless tobacco: Never    Tobacco comments:     Smokes 1 cigarette per day   Vaping Use    Vaping status: Never Used   Substance and Sexual Activity    Alcohol use: No    Drug use: No    Sexual activity: Defer       Peripheral Neurovascular  Peripheral Neurovascular (Adult)  Peripheral Neurovascular WDL: WDL    Neuro Cognitive  Neuro Cognitive (Adult)  Cognitive/Neuro/Behavioral WDL: .WDL except, orientation, level of consciousness, arousability  Arousal Level: arouses to pain, arouses to repeated stimulation  Orientation: oriented x 4    Learning  Learning Assessment  Learning Readiness and Ability: no barriers identified  Education Provided  Person Taught: patient  Teaching Method: verbal instruction  Teaching Focus:  symptom/problem overview  Education Outcome Evaluation: eager to learn    Respiratory  Respiratory WDL  Respiratory WDL: WDL    Abdominal Pain       Pain Assessments  Pain (Adult)  (0-10) Pain Rating: Rest: 8  Pain Location: head    NIH Stroke Scale       Marizol Baxter RN  03/08/25 22:13 EST

## 2025-03-09 NOTE — PLAN OF CARE
Goal Outcome Evaluation:      Pt admitted from ED to  for Syncope. No acute changes since admission. Pt A/Ox4, lethargic and sleepy, but easily arousable, RA, VSS. Significant other currently at bedside. No complaints at this time. Assist x1 to BSC. Potassium 3.1 - electrolyte replacement protocol initiated. NS infusing at 100 mL/hr. No seizure activity observed or reported by pt/significant other since admission. Seizure precautions in place. Neuro, Cardiology, and Psych consults placed. SCDs on. Bed alarm on. Call light within reach. Plan of care ongoing.          Problem: Adult Inpatient Plan of Care  Goal: Optimal Comfort and Wellbeing  Outcome: Progressing

## 2025-03-09 NOTE — CONSULTS
[facility]  Consult Note                                                                              3/10/2025  Kyle Lerma Juni*    Patient Identification:  Francisco Frye:   34 y.o.  female  1990     Date of Admission:3/8/2025    CC: Syncope    History of Present Illness:    This is a 34-year-old female with a past medical history significant for ADD (on Adderall), bipolar disorder, anxiety and depression, and PTSD.  She is on several psychiatric medications.    She presented to the emergency department yesterday complaining of 3-4 syncopal episodes over the past week.  She also complains of excessive fatigue, sleeping around 20 hours a day.  She was evaluated at [facility] for similar symptoms a few days ago and was discharged from the ER after a negative head CT.  She was diagnosed with dehydration.    She states that her syncopal episodes occur after she stands up and her significant other reports seeing rhythmic movements of her upper body that lasted approximately 30 seconds followed by a period of confusion and lethargy.  Reportedly has a history of childhood seizures.  In the emergency room EKG revealed sinus rhythm with a QT C interval of 608 ms. Additional workup significant for creatinine 1.12 and urine drug screen positive for amphetamines and THC.  Chest x-ray negative for any acute pulmonary process. She was not orthostatic.  She received IV fluid bolus and 2 g magnesium in the ER.  She was admitted to [facility] for further management.    Cardiology has been consulted for prolonged QTc and syncope.  I came to see patient but she is in the middle of her EEG    She has been very sleepy for a week.  When she had her syncopal episode at home, she found herself in her living room floor downstairs.  She gone to bed and that is where she woke up.  She does not know how she got there.  She has no chest pain or pressure.  She has no difficulty breathing.  She feels like her heart rates been  slow not fast.    On arrival, her potassium was very low.  Her QT interval this morning is normal and her potassium is 3.7.        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   Past Week    buPROPion SR (WELLBUTRIN SR) 200 MG 12 hr tablet Take 1 tablet by mouth Every 12 (Twelve) Hours.   Past Week    lamoTRIgine (LaMICtal) 150 MG tablet Take 1 tablet by mouth Daily.   Past Week    prazosin (MINIPRESS) 2 MG capsule Take 1 capsule by mouth 2 (Two) Times a Day.   Past Week    venlafaxine (EFFEXOR) 150 MG tablet sustained-release 24 hour 24 hr tablet Take 1 tablet by mouth Daily. (Patient taking differently: Take 225 mg by mouth Daily.)   Past Week    Vraylar 6 MG capsule Take 1 capsule by mouth Every Evening.   Past Week    albuterol sulfate  (90 Base) MCG/ACT inhaler Inhale 2 puffs Every 4 (Four) Hours As Needed for Wheezing or Shortness of Air. (Patient not taking: Reported on 3/9/2025) 8.5 g 0 Not Taking    benzonatate (TESSALON) 200 MG capsule Take 1 capsule by mouth 3 (Three) Times a Day As Needed for Cough. (Patient not taking: Reported on 3/9/2025) 21 capsule 0 Not Taking    cetirizine (zyrTEC) 10 MG tablet Take 1 tablet by mouth Daily. (Patient not taking: Reported on 3/9/2025) 30 tablet 0 Not Taking    diazePAM (VALIUM) 5 MG tablet Take 1 tablet by mouth  "As Needed for Anxiety. (Patient not taking: Reported on 3/9/2025)   Not Taking    fluticasone (FLONASE) 50 MCG/ACT nasal spray 2 sprays into the nostril(s) as directed by provider Daily. (Patient not taking: Reported on 3/9/2025) 16.5 g 0 Not Taking    hydrOXYzine pamoate (VISTARIL) 25 MG capsule Take 1 capsule by mouth 3 (Three) Times a Day As Needed for Anxiety. (Patient not taking: Reported on 3/9/2025)   Not Taking    lithium (ESKALITH) 450 MG CR tablet Take 1 tablet by mouth 2 (Two) Times a Day With Meals. (Patient not taking: Reported on 3/9/2025)   Not Taking    Vyvanse 70 MG capsule Take 1 capsule by mouth Every Morning (Patient not taking: Reported on 3/9/2025)   Not Taking       Current Meds  Scheduled Meds:Cariprazine HCl, 6 mg, Oral, Q PM  lamoTRIgine, 150 mg, Oral, Daily  nicotine, 1 patch, Transdermal, Q24H  potassium chloride, 20 mEq, Oral, Daily  prazosin, 2 mg, Oral, BID  senna-docusate sodium, 2 tablet, Oral, BID  sodium chloride, 10 mL, Intravenous, Q12H  venlafaxine XR, 225 mg, Oral, Daily With Breakfast      Continuous Infusions:       Social History:   Social History     Socioeconomic History    Marital status:    Tobacco Use    Smoking status: Every Day     Types: Cigarettes    Smokeless tobacco: Never    Tobacco comments:     Smokes 1 pack every 4 days   Vaping Use    Vaping status: Never Used   Substance and Sexual Activity    Alcohol use: No    Drug use: Yes     Types: Amphetamines     Comment: prescription    Sexual activity: Defer       Family History:  History reviewed. No pertinent family history.      Physical Exam    /69 (BP Location: Left arm, Patient Position: Lying)   Pulse 70   Temp 98.1 °F (36.7 °C) (Oral)   Resp 18   Ht 154.9 cm (61\")   Wt 68 kg (149 lb 14.6 oz)   LMP  (LMP Unknown)   SpO2 98%   BMI 28.33 kg/m²       Alert and rhythm, no murmurs, lungs clear, alert and oriented, pulses intact.      Results from last 7 days   Lab Units 03/10/25  0559 " 03/09/25  0551 03/08/25 2022   SODIUM mmol/L 137   < > 136   POTASSIUM mmol/L 3.7  3.7   < > 3.1*   CHLORIDE mmol/L 104   < > 93*   CO2 mmol/L 25.2   < > 28.0   BUN mg/dL 11   < > 17   CREATININE mg/dL 0.75   < > 1.12*   CALCIUM mg/dL 8.5*   < > 9.3   BILIRUBIN mg/dL  --   --  0.2   ALK PHOS U/L  --   --  89   ALT (SGPT) U/L  --   --  16   AST (SGOT) U/L  --   --  24   GLUCOSE mg/dL 95   < > 111*    < > = values in this interval not displayed.     Results from last 7 days   Lab Units 03/08/25 2022   CK TOTAL U/L 71     Results from last 7 days   Lab Units 03/10/25  0559 03/09/25  0551 03/08/25 2022   WBC 10*3/mm3 4.86 6.88 7.82   HEMOGLOBIN g/dL 13.3 13.9 15.3   HEMATOCRIT % 39.3 40.6 44.7   PLATELETS 10*3/mm3 368 453* 478*         Results from last 7 days   Lab Units 03/08/25 2022   MAGNESIUM mg/dL 2.3         Results from last 7 days   Lab Units 03/08/25 2022   TSH uIU/mL 0.897               I personally viewed and interpreted the patient's EKG/Telemetry data  I have reviewed HPI and ROS above.    Assessment and Plan  1.  QTc prolongation-resolved with potassium replacement.  This can be seen with hypokalemia, amphetamine use, THC use, Vyvanse use and on occasion Effexor.  Patient has had exposure to all of this..  Primary service will need to decide completely different regimen for her.  Psychiatry consult pending  2.  Hypokalemia - take daily kcl.   3.  ADD with posttraumatic stress disorder depression/bipolar disorder  4.  Substance use/cannabis  5.  Questionable seizures  6.  Orthostatic symptoms.    Echo today. Holter at d/c.      Toshia rose  3/10/2025  13:29 EDT    .     Dictated utilizing Dragon dictation

## 2025-03-09 NOTE — H&P
A Admission H&P    Patient Care Team:  Provider, No Known as PCP - General  Provider, No Known as PCP - Family Medicine    Chief complaint: Fatigue, excessive sleepiness, passing out episodes    History of Present Illness    This is a 34-year-old female with history of ADD, bipolar disorder, anxiety and depression, PTSD who is on several psychiatric medications and presented to the emergency room after she has experienced 3-4 passing out episodes over the past week along with excessive fatigue and sleepiness.  She is sleeping upwards of 20 hours a day.  She has never experienced symptoms like this before.  She denies any recent changes to her psychiatric medications.  This is her second ER visit for the symptoms.  She first went to [facility] a couple of days ago and had a head CT that was negative.  She was told she was dehydrated and discharged.  She states that these syncopal episodes only occur after she stands up.  Today her significant other is present with her and he believes that during one of her syncopal episodes she did experience rhythmic upper body convulsions that lasted about 30 seconds followed by a very brief period of confusion and lethargy.  Patient states that she had childhood seizures but has never been on antiepileptics.  She denies any fevers or chills.  No cough or shortness of breath.  She reports palpitations.  She states that for the past week she has had a constant frontal headache with light and sound sensitivity.  Workup in the emergency room revealed a prolonged QT interval on EKG and mild hypokalemia.  Orthostatics were negative.  Patient states that she has not taken any of her home medications since the onset of her symptoms but was previously compliant.    Past Medical History:   Diagnosis Date    ADD (attention deficit disorder)     Anxiety     Bipolar disorder     type I    Depression     Night terrors     PTSD (post-traumatic stress disorder)      Past Surgical History:    Procedure Laterality Date    APPENDECTOMY      CHOLECYSTECTOMY      TUBAL ABDOMINAL LIGATION       No family history on file.  Social History     Tobacco Use    Smoking status: Every Day    Smokeless tobacco: Never    Tobacco comments:     Smokes 1 cigarette per day   Vaping Use    Vaping status: Never Used   Substance Use Topics    Alcohol use: No    Drug use: No     (Not in a hospital admission)    Allergies:  Nsaids    Review of Systems   Constitutional:  Positive for fatigue. Negative for chills and fever.   HENT:  Negative for congestion and sore throat.    Eyes:  Negative for visual disturbance.   Respiratory:  Negative for cough, chest tightness, shortness of breath and wheezing.    Cardiovascular:  Positive for palpitations. Negative for chest pain and leg swelling.   Gastrointestinal:  Negative for abdominal distention, abdominal pain, diarrhea, nausea and vomiting.   Endocrine: Negative for polydipsia and polyuria.   Genitourinary:  Negative for difficulty urinating, dysuria, frequency and urgency.   Musculoskeletal:  Negative for arthralgias and myalgias.   Skin:  Negative for color change and rash.   Neurological:  Positive for seizures, syncope and headaches. Negative for dizziness and light-headedness.        PHYSICAL EXAM    Vital Signs  tMax 24 hrs:  Temp (24hrs), Av.1 °F (36.7 °C), Min:98.1 °F (36.7 °C), Max:98.1 °F (36.7 °C)    Vitals Ranges:  Temp:  [98.1 °F (36.7 °C)] 98.1 °F (36.7 °C)  Heart Rate:  [] 86  Resp:  [16] 16  BP: (106-121)/(48-85) 112/67    Physical Exam  Vitals and nursing note reviewed.   Constitutional:       General: She is not in acute distress.  HENT:      Head: Normocephalic and atraumatic.   Eyes:      Extraocular Movements: Extraocular movements intact.      Pupils: Pupils are equal, round, and reactive to light.   Cardiovascular:      Rate and Rhythm: Normal rate and regular rhythm.   Pulmonary:      Effort: Pulmonary effort is normal. No respiratory distress.       Breath sounds: Normal breath sounds.   Abdominal:      General: There is no distension.      Palpations: Abdomen is soft.      Tenderness: There is no abdominal tenderness.   Musculoskeletal:         General: No swelling or deformity.      Cervical back: Normal range of motion.   Skin:     General: Skin is warm and dry.   Neurological:      General: No focal deficit present.      Mental Status: She is alert and oriented to person, place, and time.         Results Review:  Results from last 7 days   Lab Units 03/08/25 2022   WBC 10*3/mm3 7.82   HEMOGLOBIN g/dL 15.3   HEMATOCRIT % 44.7   PLATELETS 10*3/mm3 478*     Results from last 7 days   Lab Units 03/08/25 2022   SODIUM mmol/L 136   POTASSIUM mmol/L 3.1*   CHLORIDE mmol/L 93*   CO2 mmol/L 28.0   BUN mg/dL 17   CREATININE mg/dL 1.12*   CALCIUM mg/dL 9.3   BILIRUBIN mg/dL 0.2   ALK PHOS U/L 89   ALT (SGPT) U/L 16   AST (SGOT) U/L 24   GLUCOSE mg/dL 111*        I reviewed the patient's new clinical results.  I reviewed the patient's new imaging results and agree with the interpretation.  I personally viewed and interpreted the patient's EKG/Telemetry data        Active Hospital Problems    Diagnosis  POA    **Syncope [R55]  Yes    Bipolar 1 disorder [F31.9]  Unknown    ADD (attention deficit disorder) [F98.8]  Unknown    Anxiety disorder [F41.9]  Unknown    Hypokalemia [E87.6]  Unknown    Prolonged QT interval [R94.31]  Unknown    Lethargy [R53.83]  Unknown      Resolved Hospital Problems   No resolved problems to display.       Assessment & Plan    The patient will be admitted.  I am going to check an echocardiogram for the reports of syncope, correct her electrolytes, check TSH, and repeat EKG in the morning.  Avoid QT prolonging medications.  Consult cardiology.  Significant other also reports 1 episode of generalized upper body convulsions associated with passing out.  She has a childhood history of seizures.  Will consult neurology.  Head CT from 2 days  ago at [facility] was unremarkable.  Will utilize seizure precautions for now.  Replace her potassium.  Gentle IV fluids overnight tonight.  I am also going to asked psychiatry to review her medications.  Noted that she reports not having taken these in about 1 week since the onset of her symptoms.  At the present time I do not see any infectious sources.  I will check a urine drug screen.  Additional plans based on her clinical course.    I discussed the patients findings and my recommendations with patient      Kyle Patton MD  03/08/25  22:38 EST

## 2025-03-10 ENCOUNTER — APPOINTMENT (OUTPATIENT)
Dept: CARDIOLOGY | Facility: HOSPITAL | Age: 35
End: 2025-03-10
Payer: COMMERCIAL

## 2025-03-10 ENCOUNTER — APPOINTMENT (OUTPATIENT)
Dept: MRI IMAGING | Facility: HOSPITAL | Age: 35
End: 2025-03-10
Payer: COMMERCIAL

## 2025-03-10 LAB
ANION GAP SERPL CALCULATED.3IONS-SCNC: 7.8 MMOL/L (ref 5–15)
ASCENDING AORTA: 2.7 CM
AV MEAN PRESS GRAD SYS DOP V1V2: 3 MMHG
AV VMAX SYS DOP: 126 CM/SEC
BH CV ECHO MEAS - ACS: 2.02 CM
BH CV ECHO MEAS - AO MAX PG: 6.4 MMHG
BH CV ECHO MEAS - AO ROOT DIAM: 2.8 CM
BH CV ECHO MEAS - AO V2 VTI: 20 CM
BH CV ECHO MEAS - AVA(I,D): 2.7 CM2
BH CV ECHO MEAS - EDV(CUBED): 63.1 ML
BH CV ECHO MEAS - EDV(MOD-SP2): 73 ML
BH CV ECHO MEAS - EDV(MOD-SP4): 83 ML
BH CV ECHO MEAS - EF(MOD-SP2): 63 %
BH CV ECHO MEAS - EF(MOD-SP4): 61.4 %
BH CV ECHO MEAS - ESV(CUBED): 16.4 ML
BH CV ECHO MEAS - ESV(MOD-SP2): 27 ML
BH CV ECHO MEAS - ESV(MOD-SP4): 32 ML
BH CV ECHO MEAS - FS: 36.2 %
BH CV ECHO MEAS - IVS/LVPW: 1.09 CM
BH CV ECHO MEAS - IVSD: 1.03 CM
BH CV ECHO MEAS - LAT PEAK E' VEL: 11.9 CM/SEC
BH CV ECHO MEAS - LV DIASTOLIC VOL/BSA (35-75): 49.8 CM2
BH CV ECHO MEAS - LV MASS(C)D: 123.6 GRAMS
BH CV ECHO MEAS - LV MAX PG: 3.2 MMHG
BH CV ECHO MEAS - LV MEAN PG: 2 MMHG
BH CV ECHO MEAS - LV SYSTOLIC VOL/BSA (12-30): 19.2 CM2
BH CV ECHO MEAS - LV V1 MAX: 89.7 CM/SEC
BH CV ECHO MEAS - LV V1 VTI: 14.6 CM
BH CV ECHO MEAS - LVIDD: 4 CM
BH CV ECHO MEAS - LVIDS: 2.5 CM
BH CV ECHO MEAS - LVOT AREA: 3.8 CM2
BH CV ECHO MEAS - LVOT DIAM: 2.19 CM
BH CV ECHO MEAS - LVPWD: 0.94 CM
BH CV ECHO MEAS - MED PEAK E' VEL: 8.4 CM/SEC
BH CV ECHO MEAS - MV A DUR: 0.1 SEC
BH CV ECHO MEAS - MV A MAX VEL: 63 CM/SEC
BH CV ECHO MEAS - MV DEC SLOPE: 505.7 CM/SEC2
BH CV ECHO MEAS - MV DEC TIME: 0.17 SEC
BH CV ECHO MEAS - MV E MAX VEL: 79.3 CM/SEC
BH CV ECHO MEAS - MV E/A: 1.26
BH CV ECHO MEAS - MV MAX PG: 3 MMHG
BH CV ECHO MEAS - MV MEAN PG: 1.42 MMHG
BH CV ECHO MEAS - MV P1/2T: 51.8 MSEC
BH CV ECHO MEAS - MV V2 VTI: 18.8 CM
BH CV ECHO MEAS - MVA(P1/2T): 4.2 CM2
BH CV ECHO MEAS - MVA(VTI): 2.9 CM2
BH CV ECHO MEAS - PA ACC TIME: 0.08 SEC
BH CV ECHO MEAS - PA V2 MAX: 112.5 CM/SEC
BH CV ECHO MEAS - PULM A REVS DUR: 0.08 SEC
BH CV ECHO MEAS - PULM A REVS VEL: 24.4 CM/SEC
BH CV ECHO MEAS - PULM DIAS VEL: 45.4 CM/SEC
BH CV ECHO MEAS - PULM S/D: 1.15
BH CV ECHO MEAS - PULM SYS VEL: 52.3 CM/SEC
BH CV ECHO MEAS - RV MAX PG: 2.5 MMHG
BH CV ECHO MEAS - RV V1 MAX: 79.7 CM/SEC
BH CV ECHO MEAS - RV V1 VTI: 11.6 CM
BH CV ECHO MEAS - SV(LVOT): 54.9 ML
BH CV ECHO MEAS - SV(MOD-SP2): 46 ML
BH CV ECHO MEAS - SV(MOD-SP4): 51 ML
BH CV ECHO MEAS - SVI(LVOT): 32.9 ML/M2
BH CV ECHO MEAS - SVI(MOD-SP2): 27.6 ML/M2
BH CV ECHO MEAS - SVI(MOD-SP4): 30.6 ML/M2
BH CV ECHO MEAS - TAPSE (>1.6): 2.6 CM
BH CV ECHO MEASUREMENTS AVERAGE E/E' RATIO: 7.81
BH CV XLRA - TDI S': 14.3 CM/SEC
BUN SERPL-MCNC: 11 MG/DL (ref 6–20)
BUN/CREAT SERPL: 14.7 (ref 7–25)
CALCIUM SPEC-SCNC: 8.5 MG/DL (ref 8.6–10.5)
CHLORIDE SERPL-SCNC: 104 MMOL/L (ref 98–107)
CO2 SERPL-SCNC: 25.2 MMOL/L (ref 22–29)
CREAT SERPL-MCNC: 0.75 MG/DL (ref 0.57–1)
DEPRECATED RDW RBC AUTO: 41.6 FL (ref 37–54)
EGFRCR SERPLBLD CKD-EPI 2021: 106 ML/MIN/1.73
ERYTHROCYTE [DISTWIDTH] IN BLOOD BY AUTOMATED COUNT: 11.8 % (ref 12.3–15.4)
GLUCOSE SERPL-MCNC: 95 MG/DL (ref 65–99)
HCT VFR BLD AUTO: 39.3 % (ref 34–46.6)
HGB BLD-MCNC: 13.3 G/DL (ref 12–15.9)
LEFT ATRIUM VOLUME INDEX: 17.4 ML/M2
LV EF BIPLANE MOD: 61.2 %
MCH RBC QN AUTO: 31.9 PG (ref 26.6–33)
MCHC RBC AUTO-ENTMCNC: 33.8 G/DL (ref 31.5–35.7)
MCV RBC AUTO: 94.2 FL (ref 79–97)
PLATELET # BLD AUTO: 368 10*3/MM3 (ref 140–450)
PMV BLD AUTO: 9.1 FL (ref 6–12)
POTASSIUM SERPL-SCNC: 3.7 MMOL/L (ref 3.5–5.2)
POTASSIUM SERPL-SCNC: 3.7 MMOL/L (ref 3.5–5.2)
QT INTERVAL: 415 MS
QT INTERVAL: 530 MS
QTC INTERVAL: 422 MS
QTC INTERVAL: 625 MS
RBC # BLD AUTO: 4.17 10*6/MM3 (ref 3.77–5.28)
SINUS: 2.8 CM
SODIUM SERPL-SCNC: 137 MMOL/L (ref 136–145)
STJ: 2.22 CM
WBC NRBC COR # BLD AUTO: 4.86 10*3/MM3 (ref 3.4–10.8)

## 2025-03-10 PROCEDURE — 95720 EEG PHY/QHP EA INCR W/VEEG: CPT | Performed by: PSYCHIATRY & NEUROLOGY

## 2025-03-10 PROCEDURE — 93306 TTE W/DOPPLER COMPLETE: CPT

## 2025-03-10 PROCEDURE — 84132 ASSAY OF SERUM POTASSIUM: CPT | Performed by: STUDENT IN AN ORGANIZED HEALTH CARE EDUCATION/TRAINING PROGRAM

## 2025-03-10 PROCEDURE — 99215 OFFICE O/P EST HI 40 MIN: CPT | Performed by: PHYSICIAN ASSISTANT

## 2025-03-10 PROCEDURE — G0378 HOSPITAL OBSERVATION PER HR: HCPCS

## 2025-03-10 PROCEDURE — 85027 COMPLETE CBC AUTOMATED: CPT | Performed by: STUDENT IN AN ORGANIZED HEALTH CARE EDUCATION/TRAINING PROGRAM

## 2025-03-10 PROCEDURE — 25010000002 METOCLOPRAMIDE PER 10 MG: Performed by: PHYSICIAN ASSISTANT

## 2025-03-10 PROCEDURE — 93306 TTE W/DOPPLER COMPLETE: CPT | Performed by: INTERNAL MEDICINE

## 2025-03-10 PROCEDURE — 93005 ELECTROCARDIOGRAM TRACING: CPT | Performed by: INTERNAL MEDICINE

## 2025-03-10 PROCEDURE — 80048 BASIC METABOLIC PNL TOTAL CA: CPT | Performed by: STUDENT IN AN ORGANIZED HEALTH CARE EDUCATION/TRAINING PROGRAM

## 2025-03-10 PROCEDURE — 96375 TX/PRO/DX INJ NEW DRUG ADDON: CPT

## 2025-03-10 PROCEDURE — 93010 ELECTROCARDIOGRAM REPORT: CPT | Performed by: INTERNAL MEDICINE

## 2025-03-10 PROCEDURE — 63710000001 DIPHENHYDRAMINE PER 50 MG: Performed by: PHYSICIAN ASSISTANT

## 2025-03-10 RX ORDER — DIPHENHYDRAMINE HCL 25 MG
25 CAPSULE ORAL ONCE
Status: COMPLETED | OUTPATIENT
Start: 2025-03-10 | End: 2025-03-10

## 2025-03-10 RX ORDER — METOCLOPRAMIDE HYDROCHLORIDE 5 MG/ML
5 INJECTION INTRAMUSCULAR; INTRAVENOUS ONCE
Status: COMPLETED | OUTPATIENT
Start: 2025-03-10 | End: 2025-03-10

## 2025-03-10 RX ORDER — POTASSIUM CHLORIDE 1500 MG/1
20 TABLET, EXTENDED RELEASE ORAL DAILY
Status: DISCONTINUED | OUTPATIENT
Start: 2025-03-10 | End: 2025-03-12 | Stop reason: HOSPADM

## 2025-03-10 RX ADMIN — PRAZOSIN HYDROCHLORIDE 2 MG: 2 CAPSULE ORAL at 08:33

## 2025-03-10 RX ADMIN — METOCLOPRAMIDE 5 MG: 5 INJECTION, SOLUTION INTRAMUSCULAR; INTRAVENOUS at 12:33

## 2025-03-10 RX ADMIN — LAMOTRIGINE 150 MG: 100 TABLET ORAL at 09:41

## 2025-03-10 RX ADMIN — Medication 10 ML: at 08:34

## 2025-03-10 RX ADMIN — NICOTINE 1 PATCH: 14 PATCH TRANSDERMAL at 08:32

## 2025-03-10 RX ADMIN — DIPHENHYDRAMINE HYDROCHLORIDE 25 MG: 25 CAPSULE ORAL at 12:32

## 2025-03-10 RX ADMIN — CARIPRAZINE 6 MG: 3 CAPSULE, GELATIN COATED ORAL at 16:13

## 2025-03-10 RX ADMIN — VENLAFAXINE HYDROCHLORIDE 225 MG: 75 CAPSULE, EXTENDED RELEASE ORAL at 08:32

## 2025-03-10 RX ADMIN — POTASSIUM CHLORIDE 40 MEQ: 1500 TABLET, EXTENDED RELEASE ORAL at 00:56

## 2025-03-10 RX ADMIN — Medication 10 ML: at 22:15

## 2025-03-10 RX ADMIN — POTASSIUM CHLORIDE 20 MEQ: 1500 TABLET, EXTENDED RELEASE ORAL at 16:13

## 2025-03-10 NOTE — CASE MANAGEMENT/SOCIAL WORK
Continued Stay Note  [facility]     Patient Name: Francisco Frye  MRN: [id number]  Today's Date: 3/10/2025    Admit Date: 3/8/2025        Discharge Plan       Row Name 03/10/25 135       Plan    Plan Comments Attempted to screen. Patient resting did not answer CCP.                   Discharge Codes    No documentation.                       Toshia Borja RN

## 2025-03-10 NOTE — PROGRESS NOTES
IDENTIFYING INFORMATION: The patient is a 34-year-old white female admitted with complaints of seizure-like activity and hypersomnia    CHIEF COMPLAINT: None given    INFORMANT: Patient,  and chart    RELIABILITY: Good    HISTORY OF PRESENT ILLNESS: The patient is a 34-year-old white female admitted on 3/8/2025 with hypersomnia, lightheadedness and seizure-like spells.  The patient is currently followed on patient basis by a psychiatrist and is on multiple psychotropic medications.  She feels as though she is overmedicated and is about to change providers to [facility].  Her currently prescribed psychiatric medications include Lamictal and Wellbutrin Minipress Effexor Vraylar and Adderall.  She stopped all medications several days ago due to fatigue.  24-hour EEG was negative.  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  She denies abuse of any psychoactive substances.  Her psychiatric diagnoses include attention deficit disorder, bipolar disorder and PTSD.  She denies current suicidal or homicidal ideation or psychotic features.  She denies abuse of any psychoactive substances.    PAST PSYCHIATRIC HISTORY: As above    PAST MEDICAL HISTORY: Noncontributory    MEDICATIONS: Lamictal, Wellbutrin, Minipress, Effexor, Vraylar, Adderall    ALLERGIES: Nonsteroidal anti-inflammatories    FAMILY HISTORY: Noncontributory    SOCIAL HISTORY: Patient lives with her .  She works from home.  She denies abuse of any psychoactive substances.    MENTAL STATUS EXAM: Patient is a well-developed well-nourished white female appearing her stated age.  She is mildly groggy but oriented in all spheres.  Her mood is euthymic her affect congruent.  Speech is relevant and coherent.  There are no gross deficits in memory or cognition noted.  Intelligence is judged to be in the average range based on fund of knowledge, the patient is cooperative Ath review.  She denies current suicidal or  homicidal ideation or psychotic features.  Judgement and insight are reasonably intact.    ASSETS/LIABILITIES: Supportive /health issues    DIAGNOSTIC IMPRESSION: Posttraumatic stress disorder per patient history, in addition to have disorder per patient history, bipolar disorder unspecified per patient history, medical problems as noted previously    PLAN: The patient is currently on no psychotropic medications which should be causing daytime hypersomnia.  In fact apart from Minipress all of her medications have at least some degree of stimulant effect,  Particularly Adderall.  The patient reports that she will begin follow-up with [facility] in the near future and I have suggested that she work with her mental health professionals there to adjust medications to address any symptoms.  I suspect that there may be a significant functional aspect to the patient's symptomatology.  Little else to add from a psychiatric standpoint, I will sign off

## 2025-03-10 NOTE — PLAN OF CARE
VSS. Eeg done today, and echo. Off floor for part of done. Family at bedside.  Problem: Adult Inpatient Plan of Care  Goal: Plan of Care Review  Outcome: Progressing  Goal: Patient-Specific Goal (Individualized)  Outcome: Progressing  Goal: Absence of Hospital-Acquired Illness or Injury  Outcome: Progressing  Intervention: Identify and Manage Fall Risk  Recent Flowsheet Documentation  Taken 3/10/2025 1604 by Marija Alonso RN  Safety Promotion/Fall Prevention:   clutter free environment maintained   safety round/check completed  Taken 3/10/2025 1401 by Marija Alonso RN  Safety Promotion/Fall Prevention: patient off unit  Taken 3/10/2025 1210 by Marija Alonso RN  Safety Promotion/Fall Prevention:   clutter free environment maintained   safety round/check completed  Taken 3/10/2025 1005 by Marija Alonso RN  Safety Promotion/Fall Prevention:   clutter free environment maintained   safety round/check completed  Taken 3/10/2025 0832 by Marija Alonso RN  Safety Promotion/Fall Prevention:   clutter free environment maintained   safety round/check completed  Intervention: Prevent Skin Injury  Recent Flowsheet Documentation  Taken 3/10/2025 1604 by Marija Alonso RN  Body Position:   head facing, left   side-lying  Taken 3/10/2025 1210 by Marija Alonso RN  Body Position:   supine   position changed independently  Taken 3/10/2025 1005 by Marija Alonso RN  Body Position:   supine   position changed independently  Taken 3/10/2025 0832 by Marija Alonso RN  Body Position:   supine   position changed independently  Intervention: Prevent and Manage VTE (Venous Thromboembolism) Risk  Recent Flowsheet Documentation  Taken 3/10/2025 0832 by Marija Alonso RN  VTE Prevention/Management: SCDs (sequential compression devices) off  Intervention: Prevent Infection  Recent Flowsheet Documentation  Taken 3/10/2025 1210 by Marija Alonso RN  Infection Prevention:   environmental surveillance  performed   single patient room provided  Taken 3/10/2025 1005 by Marija Alonso RN  Infection Prevention:   environmental surveillance performed   single patient room provided  Taken 3/10/2025 0832 by Marija Alonso RN  Infection Prevention:   environmental surveillance performed   single patient room provided  Goal: Optimal Comfort and Wellbeing  Outcome: Progressing  Intervention: Monitor Pain and Promote Comfort  Recent Flowsheet Documentation  Taken 3/10/2025 1232 by Marija Alonso RN  Pain Management Interventions: pain medication given  Intervention: Provide Person-Centered Care  Recent Flowsheet Documentation  Taken 3/10/2025 1604 by Marija Alonso RN  Trust Relationship/Rapport:   care explained   thoughts/feelings acknowledged  Taken 3/10/2025 0832 by Marija Alonso RN  Trust Relationship/Rapport:   care explained   thoughts/feelings acknowledged  Goal: Readiness for Transition of Care  Outcome: Progressing   Goal Outcome Evaluation:

## 2025-03-10 NOTE — PLAN OF CARE
One episode of witnessed seizure like activity, pt had unilateral shaking of right upper extremity lasting approximately 10 seconds. Pt with no post ictal behaviors, immediately able to raise to get on bed pan, but pt acknowledged event occurred.  Problem: Adult Inpatient Plan of Care  Goal: Plan of Care Review  Outcome: Progressing   Goal Outcome Evaluation:

## 2025-03-10 NOTE — PROGRESS NOTES
"DOS: 3/10/2025  NAME: Francisco Frye   : 1990  PCP: Provider, No Known  Chief Complaint   Patient presents with    Weakness - Generalized       Chief complaint: seizure activity  Subjective: Significant other at bedside.  Patient complaining of headache.  She has a daily headache.  About twice a month she has a terrible debilitating headache that she has to sleep off.  Long history of migraines.  Does not feel particular increased stress or depression.  It is the birthday of her  child    Objective:  Vital signs: /69 (BP Location: Left arm, Patient Position: Lying)   Pulse 70   Temp 98.1 °F (36.7 °C) (Oral)   Resp 18   Ht 154.9 cm (61\")   Wt 68 kg (149 lb 14.6 oz)   LMP  (LMP Unknown)   SpO2 98%   BMI 28.33 kg/m²      Gen: NAD, vitals reviewed  MS: oriented x3, recent/remote memory intact, normal attention/concentration, language intact, no neglect.  CN: visual acuity grossly normal, mild ptosis OS PERRL, EOMI, no facial droop, no dysarthria  Motor: 5/5 throughout upper and lower extremities, normal tone  Sensory: intact to light touch all 4 ext.    ROS:  No weakness, numbness  No fevers, chills      Laboratory results:  Lab Results   Component Value Date    GLUCOSE 95 03/10/2025    CALCIUM 8.5 (L) 03/10/2025     03/10/2025    K 3.7 03/10/2025    K 3.7 03/10/2025    CO2 25.2 03/10/2025     03/10/2025    BUN 11 03/10/2025    CREATININE 0.75 03/10/2025    BCR 14.7 03/10/2025    ANIONGAP 7.8 03/10/2025     Lab Results   Component Value Date    WBC 4.86 03/10/2025    HGB 13.3 03/10/2025    HCT 39.3 03/10/2025    MCV 94.2 03/10/2025     03/10/2025     No results found for: \"LDL\"         Review of labs: Potassium 2.6 yesterday improved to 3.7, UDS positive for amphetamines, THC    Review and interpretation of imaging: MRI pending    Workup to date: More than 16 hours EEG is normal    Diagnoses:  Near syncope  Convulsive spell  PTSD  Migraine headaches    Impression: " 34-year-old female with history of bipolar, ADHD, PTSD, anxiety/depression on several psychiatric medications.  She comes to the ER after experiencing 3-4 passing out episodes over the last week with complaints of excess fatigue and sleepiness.  She reports sleeping about 20 hours a day.  The symptoms reportedly are new and exacerbated by standing up.  She was recently seen at [facility] for dehydration.  Neurology was consulted for seizure-like activity.  24-hour EEG was requested as well as MRI brain.  Cardiology also consulted.  She has prolonged QTc of possible med effect.  She is not orthostatic    She c/o headache c/w migraine today.  Migraine cocktail ordered.  We discussed importance of ongoing psychiatric follow up.  Agree with zio at d/c      Neuro team will follow peripherally on MRI       I spent at least 50 minutes interviewing, examining, and counseling patient.  I independently reviewed documentation, laboratory and diagnostic findings, external documentation where applicable, and formulated treatment plan which was discussed with the patient.      Thank you for this consultation.  Discussed above plan with neuro attending, Dr. Jones who agrees with above plan.  Neurology team is available for concerns or questions.

## 2025-03-10 NOTE — PROGRESS NOTES
Name: Francisco Frye ADMIT: 3/8/2025   : 1990  PCP: Provider, No Known    MRN: [id number] LOS: 0 days   AGE/SEX: 34 y.o. female  ROOM: [facility]     Subjective   Subjective   She is feeling a little better today, though continues to feel fatigued overall.    Objective   Objective   Vital Signs  Temp:  [97.9 °F (36.6 °C)-98.6 °F (37 °C)] 98.1 °F (36.7 °C)  Heart Rate:  [70-94] 70  Resp:  [17-18] 18  BP: ()/(53-75) 109/69  SpO2:  [98 %-99 %] 98 %  on   ;   Device (Oxygen Therapy): room air  Body mass index is 28.33 kg/m².  Physical Exam  Constitutional:       General: She is not in acute distress.  Pulmonary:      Effort: Pulmonary effort is normal. No respiratory distress.      Breath sounds: No stridor.   Skin:     Coloration: Skin is not jaundiced.      Findings: No bruising.   Neurological:      General: No focal deficit present.      Mental Status: She is alert.   Psychiatric:         Mood and Affect: Mood normal.         Behavior: Behavior normal.         Results Review     I reviewed the patient's new clinical results.  Results from last 7 days   Lab Units 03/10/25  0559 25  0551 25   WBC 10*3/mm3 4.86 6.88 7.82   HEMOGLOBIN g/dL 13.3 13.9 15.3   PLATELETS 10*3/mm3 368 453* 478*     Results from last 7 days   Lab Units 03/10/25  0559 25  19125  0551 25   SODIUM mmol/L 137  --  135* 136   POTASSIUM mmol/L 3.7  3.7 3.3* 2.6* 3.1*   CHLORIDE mmol/L 104  --  95* 93*   CO2 mmol/L 25.2  --  28.0 28.0   BUN mg/dL 11  --  18 17   CREATININE mg/dL 0.75  --  0.92 1.12*   GLUCOSE mg/dL 95  --  118* 111*   EGFR mL/min/1.73 106.0  --  82.9 65.5     Results from last 7 days   Lab Units 25   ALBUMIN g/dL 4.3   BILIRUBIN mg/dL 0.2   ALK PHOS U/L 89   AST (SGOT) U/L 24   ALT (SGPT) U/L 16     Results from last 7 days   Lab Units 03/10/25  0559 25  0551 25   CALCIUM mg/dL 8.5* 8.9 9.3   ALBUMIN g/dL  --   --  4.3   MAGNESIUM mg/dL  --   --   "2.3   PHOSPHORUS mg/dL  --   --  3.7       No results found for: \"HGBA1C\", \"POCGLU\"    XR Chest 1 View  Result Date: 3/8/2025   The heart size is within normal limits.  The lungs are normally aerated. There is no pleural effusion or pneumothorax.    This report was finalized on 3/8/2025 8:58 PM by Dr. Mian Mcgowan M.D on Workstation: [id number]      Scheduled Medications  Cariprazine HCl, 6 mg, Oral, Q PM  lamoTRIgine, 150 mg, Oral, Daily  nicotine, 1 patch, Transdermal, Q24H  prazosin, 2 mg, Oral, BID  senna-docusate sodium, 2 tablet, Oral, BID  sodium chloride, 10 mL, Intravenous, Q12H  venlafaxine XR, 225 mg, Oral, Daily With Breakfast    Infusions   Diet  Diet: Regular/House; Fluid Consistency: Thin (IDDSI 0)       Assessment/Plan     Active Hospital Problems    Diagnosis  POA    **Syncope [R55]  Yes    Bipolar 1 disorder [F31.9]  Unknown    ADD (attention deficit disorder) [F98.8]  Unknown    Anxiety disorder [F41.9]  Unknown    Hypokalemia [E87.6]  Unknown    Prolonged QT interval [R94.31]  Unknown    Lethargy [R53.83]  Unknown      Resolved Hospital Problems   No resolved problems to display.       34 y.o. female admitted with Syncope.      03/10/25  MRI brain and TTE pending.  Migraine cocktail per neurology.    Syncope  Questionable seizure  - differential includes POTS, PNES, epilepsy- new  - neuro consulted  -EEG negative; spell during monitoring without seizure activity  -MRI brain pending     Hypersomnia  - d/w patient plan for ambulatory referral to sleep medicine, order placed  -Krystal billingsleyal'd-not on any medications which should because of daytime hypersomnia; patient plans to follow-up with [facility]     Bipolar disorder/PTSD  - resume home psych meds- vraylar, lamictal, prazosin, effexor  - hold home wellbutrin- can lower seizure threshold         DVT prophylaxis: SCDs  Discussed with patient and IKER Durán .  Anticipated discharge home, when cleared by consultants            Tres WARNER" MD Yunior  [facility]  03/10/25  09:26 EDT

## 2025-03-10 NOTE — SIGNIFICANT NOTE
03/10/25 1155   OTHER   Discipline physical therapist   Rehab Time/Intention   Session Not Performed   (24 hr EEG in progress and MRi still pending per RN. Will hold today and follow up tomorrow)   Recommendation   PT - Next Appointment 03/11/25

## 2025-03-11 ENCOUNTER — APPOINTMENT (OUTPATIENT)
Dept: CARDIOLOGY | Facility: HOSPITAL | Age: 35
End: 2025-03-11
Payer: COMMERCIAL

## 2025-03-11 ENCOUNTER — APPOINTMENT (OUTPATIENT)
Dept: MRI IMAGING | Facility: HOSPITAL | Age: 35
End: 2025-03-11
Payer: COMMERCIAL

## 2025-03-11 PROBLEM — R53.83 LETHARGY: Status: RESOLVED | Noted: 2025-03-08 | Resolved: 2025-03-11

## 2025-03-11 LAB
ANION GAP SERPL CALCULATED.3IONS-SCNC: 11.2 MMOL/L (ref 5–15)
BUN SERPL-MCNC: 10 MG/DL (ref 6–20)
BUN/CREAT SERPL: 13.5 (ref 7–25)
CALCIUM SPEC-SCNC: 9 MG/DL (ref 8.6–10.5)
CHLORIDE SERPL-SCNC: 94 MMOL/L (ref 98–107)
CO2 SERPL-SCNC: 29.8 MMOL/L (ref 22–29)
CREAT SERPL-MCNC: 0.74 MG/DL (ref 0.57–1)
EGFRCR SERPLBLD CKD-EPI 2021: 107.7 ML/MIN/1.73
GLUCOSE SERPL-MCNC: 132 MG/DL (ref 65–99)
MAGNESIUM SERPL-MCNC: 2.2 MG/DL (ref 1.6–2.6)
POTASSIUM SERPL-SCNC: 3 MMOL/L (ref 3.5–5.2)
SODIUM SERPL-SCNC: 135 MMOL/L (ref 136–145)

## 2025-03-11 PROCEDURE — 99214 OFFICE O/P EST MOD 30 MIN: CPT | Performed by: INTERNAL MEDICINE

## 2025-03-11 PROCEDURE — 70553 MRI BRAIN STEM W/O & W/DYE: CPT

## 2025-03-11 PROCEDURE — G0378 HOSPITAL OBSERVATION PER HR: HCPCS

## 2025-03-11 PROCEDURE — 80048 BASIC METABOLIC PNL TOTAL CA: CPT | Performed by: INTERNAL MEDICINE

## 2025-03-11 PROCEDURE — 97162 PT EVAL MOD COMPLEX 30 MIN: CPT | Performed by: PHYSICAL THERAPIST

## 2025-03-11 PROCEDURE — 83735 ASSAY OF MAGNESIUM: CPT | Performed by: STUDENT IN AN ORGANIZED HEALTH CARE EDUCATION/TRAINING PROGRAM

## 2025-03-11 PROCEDURE — 25510000002 GADOBENATE DIMEGLUMINE 529 MG/ML SOLUTION: Performed by: STUDENT IN AN ORGANIZED HEALTH CARE EDUCATION/TRAINING PROGRAM

## 2025-03-11 PROCEDURE — A9577 INJ MULTIHANCE: HCPCS | Performed by: STUDENT IN AN ORGANIZED HEALTH CARE EDUCATION/TRAINING PROGRAM

## 2025-03-11 PROCEDURE — 93246 EXT ECG>7D<15D RECORDING: CPT

## 2025-03-11 RX ORDER — POTASSIUM CHLORIDE 1500 MG/1
20 TABLET, EXTENDED RELEASE ORAL DAILY
Qty: 30 TABLET | Refills: 0 | Status: SHIPPED | OUTPATIENT
Start: 2025-03-12

## 2025-03-11 RX ORDER — VENLAFAXINE HYDROCHLORIDE 225 MG/1
225 TABLET, EXTENDED RELEASE ORAL DAILY
Qty: 30 EACH | Refills: 1 | Status: SHIPPED | OUTPATIENT
Start: 2025-03-11

## 2025-03-11 RX ORDER — POTASSIUM CHLORIDE 1500 MG/1
40 TABLET, EXTENDED RELEASE ORAL ONCE
Status: COMPLETED | OUTPATIENT
Start: 2025-03-11 | End: 2025-03-11

## 2025-03-11 RX ADMIN — NICOTINE 1 PATCH: 14 PATCH TRANSDERMAL at 08:23

## 2025-03-11 RX ADMIN — PRAZOSIN HYDROCHLORIDE 2 MG: 2 CAPSULE ORAL at 20:47

## 2025-03-11 RX ADMIN — VENLAFAXINE HYDROCHLORIDE 225 MG: 75 CAPSULE, EXTENDED RELEASE ORAL at 08:20

## 2025-03-11 RX ADMIN — PRAZOSIN HYDROCHLORIDE 2 MG: 2 CAPSULE ORAL at 08:20

## 2025-03-11 RX ADMIN — GADOBENATE DIMEGLUMINE 13 ML: 529 INJECTION, SOLUTION INTRAVENOUS at 11:08

## 2025-03-11 RX ADMIN — CARIPRAZINE 6 MG: 3 CAPSULE, GELATIN COATED ORAL at 16:07

## 2025-03-11 RX ADMIN — POTASSIUM CHLORIDE 40 MEQ: 1500 TABLET, EXTENDED RELEASE ORAL at 11:58

## 2025-03-11 RX ADMIN — POTASSIUM CHLORIDE 20 MEQ: 1500 TABLET, EXTENDED RELEASE ORAL at 08:20

## 2025-03-11 RX ADMIN — LAMOTRIGINE 150 MG: 100 TABLET ORAL at 08:21

## 2025-03-11 RX ADMIN — Medication 10 ML: at 08:24

## 2025-03-11 NOTE — DISCHARGE SUMMARY
"    Patient Name: Francisco Frye  : 1990  MRN: [id number]    Date of Admission: 3/8/2025  Date of Discharge:  3/11/2025  Primary Care Physician: Provider, No Known      Chief Complaint:   Weakness - Generalized      Discharge Diagnoses     Active Hospital Problems    Diagnosis  POA    **Syncope [R55]  Yes    Bipolar 1 disorder [F31.9]  Unknown    ADD (attention deficit disorder) [F98.8]  Unknown    Anxiety disorder [F41.9]  Unknown    Hypokalemia [E87.6]  Unknown    Prolonged QT interval [R94.31]  Unknown      Resolved Hospital Problems    Diagnosis Date Resolved POA    Lethargy [R53.83] 2025 Unknown        Admitting HPI     \"This is a 34-year-old female with history of ADD, bipolar disorder, anxiety and depression, PTSD who is on several psychiatric medications and presented to the emergency room after she has experienced 3-4 passing out episodes over the past week along with excessive fatigue and sleepiness. She is sleeping upwards of 20 hours a day. She has never experienced symptoms like this before. She denies any recent changes to her psychiatric medications. This is her second ER visit for the symptoms. She first went to [place] a couple of days ago and had a head CT that was negative. She was told she was dehydrated and discharged. She states that these syncopal episodes only occur after she stands up. Today her significant other is present with her and he believes that during one of her syncopal episodes she did experience rhythmic upper body convulsions that lasted about 30 seconds followed by a very brief period of confusion and lethargy. Patient states that she had childhood seizures but has never been on antiepileptics. She denies any fevers or chills. No cough or shortness of breath. She reports palpitations. She states that for the past week she has had a constant frontal headache with light and sound sensitivity. Workup in the emergency room revealed a prolonged QT interval on EKG and " "mild hypokalemia. Orthostatics were negative. Patient states that she has not taken any of her home medications since the onset of her symptoms but was previously compliant. \"    Hospital Course     Pt admitted for syncope and excessive somnolence.  Seen in consultation with cardiology, neurology, psychiatry.  She underwent EEG and MRI of brain which were unremarkable.  There was questionable seizure activity which is most consistent with PNES and neurology has recommended follow-up in office for this.  In addition she was evaluated by cardiology with unremarkable TTE.  Zio patch has been placed at time of discharge and she will follow-up in 6 weeks.  In addition she was value by psychiatry with no changes made to her regimen and she plans outpatient follow-up.  She is symptomatically improved and stable for discharge home.    Discharge delayed by 1 day awaiting HH and walker.     Discharge Plan     Syncope  Seizure activity c/w PNES  - differential includes POTS, PNES, epilepsy- new  - neuro consulted  -EEG negative; spell during monitoring without seizure activity  -MRI brain unremarkable  -plan f/u for suspected PNES     Hypersomnia  - d/w patient plan for ambulatory referral to sleep medicine, order placed  -Krystal porter'd-not on any medications which should because of daytime hypersomnia; patient plans to follow-up with [facility]     Bipolar disorder/PTSD  - resume home psych meds- vraylar, lamictal, prazosin, effexor  - hold home wellbutrin- can lower seizure threshold    Day of Discharge     Physical Exam:  Temp:  [97.7 °F (36.5 °C)-98.6 °F (37 °C)] 97.7 °F (36.5 °C)  Heart Rate:  [77-98] 98  Resp:  [18] 18  BP: ()/(51-72) 114/72  Body mass index is 28.34 kg/m².  Physical Exam  Constitutional:       General: She is not in acute distress.  Pulmonary:      Effort: Pulmonary effort is normal. No respiratory distress.      Breath sounds: No stridor.   Skin:     Coloration: Skin is not jaundiced.      " Findings: No bruising.   Neurological:      General: No focal deficit present.      Mental Status: She is alert.   Psychiatric:         Mood and Affect: Mood normal.         Behavior: Behavior normal.   Consultants     Consult Orders (all) (From admission, onward)       Start     Ordered    03/09/25 0400  Inpatient Case Management  Consult  Once        Provider:  (Not yet assigned)    03/09/25 0400    03/08/25 2238  Inpatient Psychiatrist Consult  Once        Specialty:  Psychiatry  Provider:  Keith Bai III, MD    03/08/25 2237 03/08/25 2237  Inpatient Cardiology Consult  Once        Specialty:  Cardiology  Provider:  Uli Odell MD    03/08/25 2236 03/08/25 2236  Inpatient Neurology Consult General  Once        Specialty:  Neurology  Provider:  Sergio Linares MD    03/08/25 2236 03/08/25 2136  [facility] (on-call MD unless specified) Details  Once        Specialty:  Hospitalist  Provider:  (Not yet assigned)    03/08/25 2135                  Procedures     * Surgery not found *      Imaging Results (All)       Procedure Component Value Units Date/Time    MRI Brain With & Without Contrast [[id number]] Resulted: 03/11/25 1045     Updated: 03/11/25 1045    XR Chest 1 View [[id number]] Collected: 03/08/25 2057     Updated: 03/08/25 2101    Narrative:      CXR ONE VIEW      HISTORY: syncope     COMPARISON: None     TECHNIQUE: single portable AP       Impression:         The heart size is within normal limits.     The lungs are normally aerated. There is no pleural effusion or  pneumothorax.           This report was finalized on 3/8/2025 8:58 PM by Dr. Mian Mcgowan M.D  on Workstation: [id number]               Results for orders placed during the hospital encounter of 03/08/25    Adult Transthoracic Echo Complete W/ Cont if Necessary Per Protocol    Interpretation Summary    Left ventricular ejection fraction appears to be 61 - 65%.    Left ventricular diastolic  "function was normal.    Pertinent Labs     Results from last 7 days   Lab Units 03/10/25  0559 03/09/25  0551 03/08/25 2022   WBC 10*3/mm3 4.86 6.88 7.82   HEMOGLOBIN g/dL 13.3 13.9 15.3   PLATELETS 10*3/mm3 368 453* 478*     Results from last 7 days   Lab Units 03/11/25  0938 03/10/25  0559 03/09/25  1911 03/09/25 0551 03/08/25 2022   SODIUM mmol/L 135* 137  --  135* 136   POTASSIUM mmol/L 3.0* 3.7  3.7 3.3* 2.6* 3.1*   CHLORIDE mmol/L 94* 104  --  95* 93*   CO2 mmol/L 29.8* 25.2  --  28.0 28.0   BUN mg/dL 10 11  --  18 17   CREATININE mg/dL 0.74 0.75  --  0.92 1.12*   GLUCOSE mg/dL 132* 95  --  118* 111*   EGFR mL/min/1.73 107.7 106.0  --  82.9 65.5     Results from last 7 days   Lab Units 03/08/25 2022   ALBUMIN g/dL 4.3   BILIRUBIN mg/dL 0.2   ALK PHOS U/L 89   AST (SGOT) U/L 24   ALT (SGPT) U/L 16     Results from last 7 days   Lab Units 03/11/25  0938 03/10/25  0559 03/09/25 0551 03/08/25 2022   CALCIUM mg/dL 9.0 8.5* 8.9 9.3   ALBUMIN g/dL  --   --   --  4.3   MAGNESIUM mg/dL 2.2  --   --  2.3   PHOSPHORUS mg/dL  --   --   --  3.7       Results from last 7 days   Lab Units 03/08/25 2022   CK TOTAL U/L 71           Invalid input(s): \"LDLCALC\"      Results from last 7 days   Lab Units 03/08/25 2023   COVID19  Not Detected       Test Results Pending at Discharge     Pending Labs       Order Current Status    Lamotrigine Level In process            Discharge Details        Discharge Medications        New Medications        Instructions Start Date   potassium chloride 20 MEQ CR tablet  Commonly known as: KLOR-CON M20   20 mEq, Oral, Daily   Start Date: March 12, 2025            Changes to Medications        Instructions Start Date   venlafaxine 225 MG tablet sustained-release 24 hour 24 hr tablet  What changed:   medication strength  how much to take   225 mg, Oral, Daily             Continue These Medications        Instructions Start Date   amphetamine-dextroamphetamine 20 MG tablet  Commonly known as: " ADDERALL   20 mg, Daily      buPROPion  MG 12 hr tablet  Commonly known as: WELLBUTRIN SR   1 tablet, Every 12 Hours Scheduled      lamoTRIgine 150 MG tablet  Commonly known as: LaMICtal   150 mg, Daily      prazosin 2 MG capsule  Commonly known as: MINIPRESS   2 mg, 2 Times Daily      Vraylar 6 MG capsule  Generic drug: Cariprazine HCl   1 capsule, Every Evening             Stop These Medications      albuterol sulfate  (90 Base) MCG/ACT inhaler  Commonly known as: PROVENTIL HFA;VENTOLIN HFA;PROAIR HFA     benzonatate 200 MG capsule  Commonly known as: TESSALON     cetirizine 10 MG tablet  Commonly known as: zyrTEC     diazePAM 5 MG tablet  Commonly known as: VALIUM     fluticasone 50 MCG/ACT nasal spray  Commonly known as: FLONASE     hydrOXYzine pamoate 25 MG capsule  Commonly known as: VISTARIL     lithium 450 MG CR tablet  Commonly known as: ESKALITH     Vyvanse 70 MG capsule  Generic drug: lisdexamfetamine              Allergies   Allergen Reactions    Nsaids Other (See Comments)     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       Discharge Disposition:  Home or Self Care      Discharge Diet:  Diet Order   Procedures    Diet: Regular/House; Fluid Consistency: Thin (IDDSI 0)       Discharge Activity:   Activity Instructions       Activity as Tolerated              CODE STATUS:    Code Status and Medical Interventions: CPR (Attempt to Resuscitate); Full Support   Ordered at: 03/08/25 2235     Code Status (Patient has no pulse and is not breathing):    CPR (Attempt to Resuscitate)     Medical Interventions (Patient has pulse or is breathing):    Full Support       Future Appointments   Date Time Provider Department Center   3/31/2025  2:00 PM Sergio Castellon MD [facility] None   4/24/2025 10:00 AM Claudette Sánchez APRN [facility]     Additional Instructions for the Follow-ups that You Need to Schedule       Ambulatory Referral to Home Health (Hospital)   As directed      Face to Face  Visit Date: 3/11/2025   Follow-up provider for Plan of Care?: I treated the patient in an acute care facility and will not continue treatment after discharge.   Follow-up provider: DENILSON SOUTH [[id number]]   Reason/Clinical Findings: debility   Describe mobility limitations that make leaving home difficult: bipolar disorder   Nursing/Therapeutic Services Requested: Physical Therapy   Frequency: 1 Week 1        Ambulatory Referral to Sleep Medicine   As directed      Follow-up needed: Yes               Follow-up Information       Provider, No Known .    Contact information:  [facility] [id number]               Toshia Mcclendon MD Follow up in 6 week(s).    Specialty: Cardiology  Why: follow up in 6 weeks  Contact information:  [address] [facility] [address]  [facility] [id number] [phone]                             Additional Instructions for the Follow-ups that You Need to Schedule       Ambulatory Referral to Home Health (Hospital)   As directed      Face to Face Visit Date: 3/11/2025   Follow-up provider for Plan of Care?: I treated the patient in an acute care facility and will not continue treatment after discharge.   Follow-up provider: DENILSON SOUHT [[id number]]   Reason/Clinical Findings: debility   Describe mobility limitations that make leaving home difficult: bipolar disorder   Nursing/Therapeutic Services Requested: Physical Therapy   Frequency: 1 Week 1        Ambulatory Referral to Sleep Medicine   As directed      Follow-up needed: Yes            Time Spent on Discharge:  Greater than 30 minutes spent on discharge management including final examination, discussion of hospital stay and patient education, preparation of records, medication reconciliation, follow up planning      Tres Mojica MD  [facility]  03/11/25  11:10 EDT

## 2025-03-11 NOTE — PROGRESS NOTES
LOS: 0 days   Patient Care Team:  Provider, No Known as PCP - General  Provider, No Known as PCP - Family Medicine    Chief Complaint:     F/u long qt    Interval History:       She feels somewhat weak, she has not been out of bed to walk, she denies chest pain or heart racing.    Echo 3/10/25    Interpretation Summary         Left ventricular ejection fraction appears to be 61 - 65%.    Left ventricular diastolic function was normal.       Objective   Vital Signs  Temp:  [97.9 °F (36.6 °C)-98.6 °F (37 °C)] 97.9 °F (36.6 °C)  Heart Rate:  [77-94] 78  Resp:  [18] 18  BP: ()/(51-69) 107/51    Intake/Output Summary (Last 24 hours) at 3/11/2025 0848  Last data filed at 3/11/2025 0800  Gross per 24 hour   Intake 118 ml   Output --   Net 118 ml       Comfortable NAD  Neck supple, no JVD or thyromegaly appreciated  S1/S2 RRR, no m/r/g  Lungs CTA B, normal effort  Abdomen S/NT/ND (+) BS, no HSM appreciated  Extremities warm, no clubbing, cyanosis, or edema  No visible or palpable skin lesions  A/Ox4, mood and affect appropriate    Results Review:      Results from last 7 days   Lab Units 03/10/25  0559 03/09/25  1911 03/09/25 0551 03/08/25 2022   SODIUM mmol/L 137  --  135* 136   POTASSIUM mmol/L 3.7  3.7 3.3* 2.6* 3.1*   CHLORIDE mmol/L 104  --  95* 93*   CO2 mmol/L 25.2  --  28.0 28.0   BUN mg/dL 11  --  18 17   CREATININE mg/dL 0.75  --  0.92 1.12*   GLUCOSE mg/dL 95  --  118* 111*   CALCIUM mg/dL 8.5*  --  8.9 9.3     Results from last 7 days   Lab Units 03/08/25 2022   CK TOTAL U/L 71     Results from last 7 days   Lab Units 03/10/25  0559 03/09/25 0551 03/08/25 2022   WBC 10*3/mm3 4.86 6.88 7.82   HEMOGLOBIN g/dL 13.3 13.9 15.3   HEMATOCRIT % 39.3 40.6 44.7   PLATELETS 10*3/mm3 368 453* 478*             Results from last 7 days   Lab Units 03/08/25 2022   MAGNESIUM mg/dL 2.3           I reviewed the patient's new clinical results.  I personally viewed and interpreted the patient's EKG/Telemetry data         Medication Review:   Cariprazine HCl, 6 mg, Oral, Q PM  lamoTRIgine, 150 mg, Oral, Daily  nicotine, 1 patch, Transdermal, Q24H  potassium chloride, 20 mEq, Oral, Daily  prazosin, 2 mg, Oral, BID  senna-docusate sodium, 2 tablet, Oral, BID  sodium chloride, 10 mL, Intravenous, Q12H  venlafaxine XR, 225 mg, Oral, Daily With Breakfast             Assessment & Plan       Syncope    Bipolar 1 disorder    ADD (attention deficit disorder)    Anxiety disorder    Hypokalemia    Prolonged QT interval    Lethargy    Seizure-like spells   Hypotension  Posttraumatic stress disorder  PTSD  Long QT, resolved with replacing kcl.  At home was on Adderall, Vyvanse, lithium.    Check BMP today.  Home with 2-week Zio patch monitor.  Follow-up in our office in 6 weeks, overall stable cardiac status.  Would send her home on p.o. KCl.    Toshia Mcclendon MD  03/11/25  08:48 EDT

## 2025-03-11 NOTE — PLAN OF CARE
Problem: Adult Inpatient Plan of Care  Goal: Plan of Care Review  Outcome: Progressing  Goal: Patient-Specific Goal (Individualized)  Outcome: Progressing  Goal: Absence of Hospital-Acquired Illness or Injury  Outcome: Progressing  Intervention: Identify and Manage Fall Risk  Recent Flowsheet Documentation  Taken 3/11/2025 0600 by Cliff Bertrand RN  Safety Promotion/Fall Prevention:   activity supervised   assistive device/personal items within reach   safety round/check completed   clutter free environment maintained   nonskid shoes/slippers when out of bed  Taken 3/11/2025 0400 by Cliff Bertrand RN  Safety Promotion/Fall Prevention:   safety round/check completed   activity supervised   assistive device/personal items within reach   clutter free environment maintained   fall prevention program maintained  Taken 3/11/2025 0200 by Cliff Bertrand RN  Safety Promotion/Fall Prevention:   activity supervised   assistive device/personal items within reach   safety round/check completed   clutter free environment maintained   nonskid shoes/slippers when out of bed  Taken 3/11/2025 0000 by Cliff Bertrand RN  Safety Promotion/Fall Prevention:   safety round/check completed   activity supervised   assistive device/personal items within reach   clutter free environment maintained   fall prevention program maintained  Taken 3/10/2025 2200 by Cliff Bertrand RN  Safety Promotion/Fall Prevention:   activity supervised   assistive device/personal items within reach   safety round/check completed   clutter free environment maintained   nonskid shoes/slippers when out of bed  Taken 3/10/2025 2000 by Cliff Bertrand, RN  Safety Promotion/Fall Prevention:   safety round/check completed   activity supervised   assistive device/personal items within reach   clutter free environment maintained   fall prevention program maintained  Intervention: Prevent Skin Injury  Recent Flowsheet Documentation  Taken  3/11/2025 0600 by Cliff Bertrand RN  Skin Protection:   incontinence pads utilized   transparent dressing maintained  Taken 3/11/2025 0400 by Cliff Bertrand RN  Skin Protection:   incontinence pads utilized   silicone foam dressing in place   transparent dressing maintained  Taken 3/11/2025 0200 by Cliff Bertrand RN  Skin Protection:   incontinence pads utilized   transparent dressing maintained  Taken 3/11/2025 0000 by Cliff Bertrand RN  Skin Protection:   incontinence pads utilized   silicone foam dressing in place   transparent dressing maintained  Taken 3/10/2025 2200 by Cliff Bertrand RN  Skin Protection:   incontinence pads utilized   transparent dressing maintained  Taken 3/10/2025 2000 by Cliff Bertrand RN  Body Position: position changed independently  Skin Protection:   incontinence pads utilized   transparent dressing maintained  Intervention: Prevent and Manage VTE (Venous Thromboembolism) Risk  Recent Flowsheet Documentation  Taken 3/10/2025 2000 by Cliff Bertrand RN  VTE Prevention/Management:   bilateral   SCDs (sequential compression devices) off  Intervention: Prevent Infection  Recent Flowsheet Documentation  Taken 3/11/2025 0600 by Cliff Bertrand RN  Infection Prevention:   environmental surveillance performed   rest/sleep promoted  Taken 3/11/2025 0400 by Cliff Bertrand RN  Infection Prevention:   hand hygiene promoted   rest/sleep promoted   single patient room provided  Taken 3/11/2025 0200 by Cliff Bertrand RN  Infection Prevention:   environmental surveillance performed   rest/sleep promoted  Taken 3/11/2025 0000 by Cliff Bertrand RN  Infection Prevention:   hand hygiene promoted   rest/sleep promoted   single patient room provided  Taken 3/10/2025 2200 by Cliff Bertrand RN  Infection Prevention:   environmental surveillance performed   rest/sleep promoted  Taken 3/10/2025 2000 by Cliff Bertrand RN  Infection Prevention:    environmental surveillance performed   hand hygiene promoted   rest/sleep promoted   single patient room provided   cohorting utilized  Goal: Optimal Comfort and Wellbeing  Outcome: Progressing  Intervention: Provide Person-Centered Care  Recent Flowsheet Documentation  Taken 3/10/2025 2000 by Cliff Bertrand RN  Trust Relationship/Rapport:   care explained   emotional support provided   questions answered   questions encouraged   thoughts/feelings acknowledged  Goal: Readiness for Transition of Care  Outcome: Progressing     Problem: Skin Injury Risk Increased  Goal: Skin Health and Integrity  Outcome: Progressing  Intervention: Optimize Skin Protection  Recent Flowsheet Documentation  Taken 3/11/2025 0600 by Cliff Bertrand RN  Pressure Reduction Techniques:   frequent weight shift encouraged   pressure points protected  Pressure Reduction Devices:   positioning supports utilized   pressure-redistributing mattress utilized  Skin Protection:   incontinence pads utilized   transparent dressing maintained  Taken 3/11/2025 0400 by Cliff Bertrnad RN  Pressure Reduction Techniques:   frequent weight shift encouraged   heels elevated off bed   weight shift assistance provided   positioned off wounds  Pressure Reduction Devices:   positioning supports utilized   pressure-redistributing mattress utilized  Skin Protection:   incontinence pads utilized   silicone foam dressing in place   transparent dressing maintained  Taken 3/11/2025 0200 by Cliff Bertrand RN  Pressure Reduction Techniques:   frequent weight shift encouraged   pressure points protected  Pressure Reduction Devices:   positioning supports utilized   pressure-redistributing mattress utilized  Skin Protection:   incontinence pads utilized   transparent dressing maintained  Taken 3/11/2025 0000 by Cliff Bertrand RN  Pressure Reduction Techniques:   frequent weight shift encouraged   heels elevated off bed   weight shift assistance  provided   positioned off wounds  Pressure Reduction Devices:   positioning supports utilized   pressure-redistributing mattress utilized  Skin Protection:   incontinence pads utilized   silicone foam dressing in place   transparent dressing maintained  Taken 3/10/2025 2200 by Cliff Bertrand RN  Pressure Reduction Techniques:   frequent weight shift encouraged   pressure points protected  Pressure Reduction Devices:   positioning supports utilized   pressure-redistributing mattress utilized  Skin Protection:   incontinence pads utilized   transparent dressing maintained  Taken 3/10/2025 2000 by Cliff Bertrand RN  Pressure Reduction Techniques:   frequent weight shift encouraged   heels elevated off bed   weight shift assistance provided   pressure points protected  Head of Bed (HOB) Positioning: HOB elevated  Pressure Reduction Devices:   pressure-redistributing mattress utilized   positioning supports utilized  Skin Protection:   incontinence pads utilized   transparent dressing maintained     Problem: Pain Acute  Goal: Optimal Pain Control and Function  Outcome: Progressing  Intervention: Optimize Psychosocial Wellbeing  Recent Flowsheet Documentation  Taken 3/10/2025 2000 by Cliff Bertrand RN  Diversional Activities:   television   smartphone  Intervention: Prevent or Manage Pain  Recent Flowsheet Documentation  Taken 3/10/2025 2000 by Cliff Bertrand RN  Medication Review/Management: medications reviewed     Problem: Mobility Impairment  Goal: Optimal Mobility  Outcome: Progressing  Intervention: Optimize Mobility  Recent Flowsheet Documentation  Taken 3/10/2025 2000 by Cliff Bertrand RN  Positioning/Transfer Devices:   pillows   in use   Goal Outcome Evaluation:            Pt has no new nursing issues at this time. Pt is a&o x4, NSR, RA, VSS. Spouse @ bedside. Seizure precautions maintained. Prozosin held per paramaters. Pt has no complaints at this time. Will continue plan of care.

## 2025-03-11 NOTE — PLAN OF CARE
Patient to DC home today with family. Patient wants to be sent home with Home health and a walker.   Problem: Adult Inpatient Plan of Care  Goal: Plan of Care Review  Outcome: Adequate for Care Transition  Goal: Patient-Specific Goal (Individualized)  Outcome: Adequate for Care Transition  Goal: Absence of Hospital-Acquired Illness or Injury  Outcome: Adequate for Care Transition  Intervention: Identify and Manage Fall Risk  Recent Flowsheet Documentation  Taken 3/11/2025 1200 by Marija Alonso RN  Safety Promotion/Fall Prevention:   clutter free environment maintained   safety round/check completed  Taken 3/11/2025 1002 by Marija Alonso RN  Safety Promotion/Fall Prevention:   clutter free environment maintained   safety round/check completed  Taken 3/11/2025 0820 by Marija Alonso RN  Safety Promotion/Fall Prevention:   clutter free environment maintained   safety round/check completed  Intervention: Prevent Skin Injury  Recent Flowsheet Documentation  Taken 3/11/2025 1200 by Marija Alonso RN  Body Position:   position changed independently   side-lying   head facing, right  Taken 3/11/2025 1002 by Marija Alonso RN  Body Position:   position changed independently   side-lying   head facing, right  Taken 3/11/2025 0820 by Marija Alonso RN  Body Position: position changed independently  Skin Protection: incontinence pads utilized  Intervention: Prevent and Manage VTE (Venous Thromboembolism) Risk  Recent Flowsheet Documentation  Taken 3/11/2025 0820 by Marija Alonso RN  VTE Prevention/Management:   bilateral   SCDs (sequential compression devices) off  Intervention: Prevent Infection  Recent Flowsheet Documentation  Taken 3/11/2025 1200 by Marija Alonso RN  Infection Prevention:   environmental surveillance performed   single patient room provided  Taken 3/11/2025 1002 by Marija Alonso RN  Infection Prevention:   environmental surveillance performed   single patient room  provided  Taken 3/11/2025 0820 by Marija Alonso RN  Infection Prevention:   environmental surveillance performed   single patient room provided  Goal: Optimal Comfort and Wellbeing  Outcome: Adequate for Care Transition  Intervention: Provide Person-Centered Care  Recent Flowsheet Documentation  Taken 3/11/2025 0820 by Marija Alonso RN  Trust Relationship/Rapport:   care explained   thoughts/feelings acknowledged  Goal: Readiness for Transition of Care  Outcome: Adequate for Care Transition     Problem: Skin Injury Risk Increased  Goal: Skin Health and Integrity  Outcome: Adequate for Care Transition  Intervention: Optimize Skin Protection  Recent Flowsheet Documentation  Taken 3/11/2025 1200 by Marija Alonso RN  Head of Bed (HOB) Positioning: HOB at 20 degrees  Taken 3/11/2025 1002 by Marija Alonso RN  Head of Bed (HOB) Positioning: HOB at 20 degrees  Taken 3/11/2025 0820 by Marija Alonso RN  Pressure Reduction Techniques: frequent weight shift encouraged  Head of Bed (HOB) Positioning: HOB at 30 degrees  Skin Protection: incontinence pads utilized     Problem: Pain Acute  Goal: Optimal Pain Control and Function  Outcome: Adequate for Care Transition  Intervention: Optimize Psychosocial Wellbeing  Recent Flowsheet Documentation  Taken 3/11/2025 0820 by Marija Alonso RN  Diversional Activities: television     Problem: Mobility Impairment  Goal: Optimal Mobility  Outcome: Adequate for Care Transition  Intervention: Optimize Mobility  Recent Flowsheet Documentation  Taken 3/11/2025 1200 by Marija Alonso RN  Positioning/Transfer Devices:   pillows   in use  Taken 3/11/2025 1002 by Marija Alonso RN  Positioning/Transfer Devices:   pillows   in use  Taken 3/11/2025 0820 by Marija Alonso RN  Positioning/Transfer Devices:   pillows   in use   Goal Outcome Evaluation:

## 2025-03-11 NOTE — THERAPY EVALUATION
Patient Name: Francisco Frye  : 1990    MRN: [id number]                              Today's Date: 3/11/2025       Admit Date: 3/8/2025    Visit Dx:     ICD-10-CM ICD-9-CM   1. Long QT interval  R94.31 794.31   2. Syncope and collapse  R55 780.2   3. Profound fatigue  R53.83 780.79   4. Hypersomnia  G47.10 780.54     Patient Active Problem List   Diagnosis    Syncope    Bipolar 1 disorder    ADD (attention deficit disorder)    Anxiety disorder    Hypokalemia    Prolonged QT interval     Past Medical History:   Diagnosis Date    ADD (attention deficit disorder)     Anxiety     Bipolar disorder     type I    Depression     Night terrors     PTSD (post-traumatic stress disorder)      Past Surgical History:   Procedure Laterality Date    APPENDECTOMY      CHOLECYSTECTOMY      TUBAL ABDOMINAL LIGATION        General Information       Row Name 25 1312          Physical Therapy Time and Intention    Document Type evaluation  -     Mode of Treatment individual therapy;physical therapy  -       Row Name 25 1312          General Information    Patient Profile Reviewed yes  -     Prior Level of Function independent:  -     Barriers to Rehab none identified  -       Row Name 25 1312          Living Environment    Current Living Arrangements home  -     People in Home significant other  -       Row Name 25 1312          Home Main Entrance    Number of Stairs, Main Entrance none  -       Row Name 25 1312          Cognition    Orientation Status (Cognition) oriented x 4  -       Row Name 25 1312          Safety Issues/Impairments Affecting Functional Mobility    Impairments Affecting Function (Mobility) balance;endurance/activity tolerance;strength  -               User Key  (r) = Recorded By, (t) = Taken By, (c) = Cosigned By      Initials Name Provider Type    Pam Thompson, PT Physical Therapist                   Mobility       Row Name 25 1312           Bed Mobility    Bed Mobility supine-sit;sit-supine  -     Supine-Sit [place] (Bed Mobility) modified independence  -     Sit-Supine [place] (Bed Mobility) modified independence  -     Assistive Device (Bed Mobility) head of bed elevated  -       Row Name 03/11/25 1313          Sit-Stand Transfer    Sit-Stand [place] (Transfers) contact guard  -[facility] Name 03/11/25 1313          Gait/Stairs (Locomotion)    [place] Level (Gait) contact guard  -     Assistive Device (Gait) --  HHA  -     Distance in Feet (Gait) 20  -     Deviations/Abnormal Patterns (Gait) gait speed decreased  -     Bilateral Gait Deviations --  Episodes of bilateral knee flexion but did not appear to be true knee buckling  -     Comment, (Gait/Stairs) Very slow gait, unsteady, narrow base of support, decreased step length, episodes of bilateral knee flexion-however this was more controlled than a true knee buckle  -               User Key  (r) = Recorded By, (t) = Taken By, (c) = Cosigned By      Initials Name Provider Type    Pam Thompson, PT Physical Therapist                   Obj/Interventions       [facility] Name 03/11/25 1315          Range of Motion Comprehensive    Comment, General Range of Motion WFL  -[facility] Name 03/11/25 1315          Strength Comprehensive (MMT)    Comment, General Manual Muscle Testing (MMT) Assessment Functional against gravity, inconsistent effort from patient on MMT  -[facility] Name 03/11/25 1315          Balance    Balance Assessment sitting static balance;sitting dynamic balance;standing static balance;standing dynamic balance  -     Static Sitting Balance supervision  -     Dynamic Sitting Balance standby assist  -     Static Standing Balance contact guard  -     Dynamic Standing Balance contact guard  -               User Key  (r) = Recorded By, (t) = Taken By, (c) = Cosigned By      Initials Name Provider Type    IRIS Roth,  Pam Downs, PT Physical Therapist                   Goals/Plan       Row Name 03/11/25 1323          Bed Mobility Goal 1 (PT)    Activity/Assistive Device (Bed Mobility Goal 1, PT) bed mobility activities, all  -KH     [place] Level/Cues Needed (Bed Mobility Goal 1, PT) independent  -KH     Time Frame (Bed Mobility Goal 1, PT) 1 week  -       Row Name 03/11/25 1323          Transfer Goal 1 (PT)    Activity/Assistive Device (Transfer Goal 1, PT) transfers, all  -KH     [place] Level/Cues Needed (Transfer Goal 1, PT) standby assist  -KH     Time Frame (Transfer Goal 1, PT) 1 week  -       Row Name 03/11/25 1323          Gait Training Goal 1 (PT)    Activity/Assistive Device (Gait Training Goal 1, PT) gait (walking locomotion)  -KH     [place] Level (Gait Training Goal 1, PT) standby assist  -KH     Distance (Gait Training Goal 1, PT) 150 ft  -KH     Time Frame (Gait Training Goal 1, PT) 1 week  -       Row Name 03/11/25 1323          Patient Education Goal (PT)    Activity (Patient Education Goal, PT) HEP  -KH     [place]/Cues/Accuracy (Memory Goal 2, PT) demonstrates adequately  -KH     Time Frame (Patient Education Goal, PT) 1 week  -       Row Name 03/11/25 1323          Therapy Assessment/Plan (PT)    Planned Therapy Interventions (PT) balance training;gait training;home exercise program;strengthening;stair training;transfer training;patient/family education  -               User Key  (r) = Recorded By, (t) = Taken By, (c) = Cosigned By      Initials Name Provider Type    Pam Thompson, PT Physical Therapist                   Clinical Impression       Row Name 03/11/25 1316          Pain    Pretreatment Pain Rating 0/10 - no pain  -     Posttreatment Pain Rating 0/10 - no pain  -       Row Name 03/11/25 1316          Plan of Care Review    Plan of Care Reviewed With patient  -     Outcome Evaluation Patient presented to the hospital with complaints of syncopal  episodes.  Full workup in progress.  Patient reports she is independently mobile at baseline with no assistive device.  She reports overwhelming fatigue and sleeping up to 20 hours/day recently.  She reports she gets lightheaded with tremors upon standing.  Patient was in bed and agreeable to therapy.  She demonstrated independent bed mobility.  She sat edge of bed with no loss of balance.  She stood with [facility], HHA.  Patient was able to ambulate to the bathroom with CGA-min assist, hand-held assist.  Gait was slow with narrow base of support and shuffling steps.  Notable for a few episodes of bilateral knee flexion, which were initially thought to be knee buckling, but were more slow and controlled flexion than a true knee buckle.  Patient recovered on her own and ultimately required only contact-guard assist for safety while ambulating.  While returning back to bed from the bathroom patient demonstrated right upper extremity tremor while holding therapist hand.  This resolved quickly once patient sat edge of bed.  Patient demonstrated functional strength against gravity with inconsistent effort given on manual muscle test.  Patient plans to discharge home with spouse when medically stable.  Patient was clearly not at her baseline on evaluation today, though findings were inconsistent and patient demonstrated limited activity tolerance.  PT will follow while patient is admitted to ensure return to baseline mobility.  Patient may benefit from  PT pending progress.  -       Row Name 03/11/25 1316          Therapy Assessment/Plan (PT)    Patient/Family Therapy Goals Statement (PT) Return home to prior level of function  -     Rehab Potential (PT) fair  -     Criteria for Skilled Interventions Met (PT) yes  -     Therapy Frequency (PT) 3 times/wk  -       Row Name 03/11/25 1316          Positioning and Restraints    Pre-Treatment Position in bed  -     Post Treatment Position bed  -KH     In Bed  fowlers;call light within reach;encouraged to call for assist;exit alarm on;notified nsg  -KH               User Key  (r) = Recorded By, (t) = Taken By, (c) = Cosigned By      Initials Name Provider Type    Pam Thompson, PT Physical Therapist                   Outcome Measures       Row Name 03/11/25 1324 03/11/25 0820       How much help from another person do you currently need...    Turning from your back to your side while in flat bed without using bedrails? 4  -KH 4  -SR    Moving from lying on back to sitting on the side of a flat bed without bedrails? 4  -KH 4  -SR    Moving to and from a bed to a chair (including a wheelchair)? 3  -KH 2  -SR    Standing up from a chair using your arms (e.g., wheelchair, bedside chair)? 3  -KH 2  -SR    Climbing 3-5 steps with a railing? 2  -KH 2  -SR    To walk in hospital room? 3  -KH 2  -SR    AM-PAC 6 Clicks Score (PT) 19  -KH 16  -SR              User Key  (r) = Recorded By, (t) = Taken By, (c) = Cosigned By      Initials Name Provider Type    Pam Thompson, PT Physical Therapist    SR Marija Alonso RN Registered Nurse                                 Physical Therapy Education       Title: PT OT SLP Therapies (Not Started)       Topic: Physical Therapy (Not Started)       Point: Mobility training (Not Started)       Learner Progress:  Not documented in this visit.              Point: Home exercise program (Not Started)       Learner Progress:  Not documented in this visit.              Point: Body mechanics (Not Started)       Learner Progress:  Not documented in this visit.              Point: Precautions (Not Started)       Learner Progress:  Not documented in this visit.                                  PT Recommendation and Plan  Planned Therapy Interventions (PT): balance training, gait training, home exercise program, strengthening, stair training, transfer training, patient/family education  Outcome Evaluation: Patient presented to  the hospital with complaints of syncopal episodes.  Full workup in progress.  Patient reports she is independently mobile at baseline with no assistive device.  She reports overwhelming fatigue and sleeping up to 20 hours/day recently.  She reports she gets lightheaded with tremors upon standing.  Patient was in bed and agreeable to therapy.  She demonstrated independent bed mobility.  She sat edge of bed with no loss of balance.  She stood with [facility], [facility].  Patient was able to ambulate to the bathroom with CGA-min assist, hand-held assist.  Gait was slow with narrow base of support and shuffling steps.  Notable for a few episodes of bilateral knee flexion, which were initially thought to be knee buckling, but were more slow and controlled flexion than a true knee buckle.  Patient recovered on her own and ultimately required only contact-guard assist for safety while ambulating.  While returning back to bed from the bathroom patient demonstrated right upper extremity tremor while holding therapist hand.  This resolved quickly once patient sat edge of bed.  Patient demonstrated functional strength against gravity with inconsistent effort given on manual muscle test.  Patient plans to discharge home with spouse when medically stable.  Patient was clearly not at her baseline on evaluation today, though findings were inconsistent and patient demonstrated limited activity tolerance.  PT will follow while patient is admitted to ensure return to baseline mobility.  Patient may benefit from  PT pending progress.     Time Calculation:         PT Charges       Row Name 03/11/25 3814             Time Calculation    Start Time 0935  -      Stop Time 0951  -      Time Calculation (min) 16 min  -KH      PT Received On 03/11/25  -      PT - Next Appointment 03/13/25  -      PT Goal Re-Cert Due Date 03/18/25  -                User Key  (r) = Recorded By, (t) = Taken By, (c) = Cosigned By      Initials Name Provider Type     KH Pam Roth, PT Physical Therapist                  Therapy Charges for Today       Code Description Service Date Service Provider Modifiers Qty    90588258846 HC PT EVAL MOD COMPLEXITY 3 3/11/2025 Pam Roth, PT GP 1            PT G-Codes  AM-PAC 6 Clicks Score (PT): 19  PT Discharge Summary  Anticipated Discharge Disposition (PT): home with assist, home with home health    Pam Roth, PT  3/11/2025

## 2025-03-11 NOTE — CASE MANAGEMENT/SOCIAL WORK
Discharge Planning Assessment  [facility]     Patient Name: Francisco Frye  MRN: [id number]  Today's Date: 3/11/2025    Admit Date: 3/8/2025    Plan: Home with family to transport.   Discharge Needs Assessment       Row Name 03/11/25 1020       Living Environment    People in Home significant other    Current Living Arrangements home    Potentially Unsafe Housing Conditions none    In the past 12 months has the electric, gas, oil, or water company threatened to shut off services in your home? No    Primary Care Provided by self    Provides Primary Care For no one    Family Caregiver if Needed significant other    Family Caregiver Names Duncan Quintana [phone]    Quality of Family Relationships unable to assess    Able to Return to Prior Arrangements yes       Resource/Environmental Concerns    Resource/Environmental Concerns none    Transportation Concerns none       Transportation Needs    In the past 12 months, has lack of transportation kept you from medical appointments or from getting medications? no    In the past 12 months, has lack of transportation kept you from meetings, work, or from getting things needed for daily living? No       Food Insecurity    Within the past 12 months, you worried that your food would run out before you got the money to buy more. Never true    Within the past 12 months, the food you bought just didn't last and you didn't have money to get more. Never true       Transition Planning    Patient/Family Anticipates Transition to home with family    Patient/Family Anticipated Services at Transition     Transportation Anticipated family or friend will provide       Discharge Needs Assessment    Readmission Within the Last 30 Days no previous admission in last 30 days    Equipment Currently Used at Home none    Concerns to be Addressed denies needs/concerns at this time;discharge planning    Do you want help finding or keeping work or a job? I do not need or want help     "Do you want help with school or training? For example, starting or completing job training or getting a high school diploma, GED or equivalent No    Anticipated Changes Related to Illness none    Equipment Needed After Discharge none    Provided Post Acute Provider List? N/A    N/A Provider List Comment Denies need    Provided Post Acute Provider Quality & Resource List? N/A    N/A Quality & Resource List Comment Denies need                   Discharge Plan       Row Name 03/11/25 1021       Plan    Plan Home with family to transport.    Roadmap to Recovery Yes    Patient/Family in Agreement with Plan yes    Plan Comments Spoke with patient at bedside. Introduced self and explained CCP role. Verified face sheet and local pharmacy is [facility], patient choice to enroll in [facility]. Patient denies difficulty with medication cost/ management. Patient lives with s/o in a 2-level home with 3 [place]. Patient denies SNF and DME history. Patient states that she had HH when she was 16, but has been so long ago she has \"no idea\". Patient plan is to return home with family to transport.                    Expected Discharge Date and Time       Expected Discharge Date Expected Discharge Time    Mar 13, 2025            Demographic Summary       Row Name 03/11/25 1017       General Information    Admission Type observation    Referral Source admission list    Reason for Consult discharge planning    Preferred Language English                   Functional Status       Row Name 03/11/25 1019       Functional Status    Usual Activity Tolerance good    Current Activity Tolerance fair       Physical Activity    On average, how many days per week do you engage in moderate to strenuous exercise (like a brisk walk)? 5 days    On average, how many minutes do you engage in exercise at this level? 30 min    Number of minutes of exercise per week 150       Functional Status, IADL    Medications independent    Meal Preparation independent    " Housekeeping independent    Laundry independent    Shopping independent    If for any reason you need help with day-to-day activities such as bathing, preparing meals, shopping, managing finances, etc., do you get the help you need? I get all the help I need       Mental Status    General Appearance WDL WDL       Mental Status Summary    Recent Changes in Mental Status/Cognitive Functioning no changes       Employment/    Employment Status unemployed                   Psychosocial       Row Name 03/11/25 1019       Values/Beliefs    Spiritual, Cultural Beliefs, Jainism Practices, Values that Affect Care no       Behavior WDL    Behavior WDL WDL       Emotion Mood WDL    Emotion/Mood/Affect WDL WDL       Mental Health    Little interest or pleasure in doing things Not at all    Feeling down, depressed, or hopeless Not at all       Speech WDL    Speech WDL WDL       Perceptual State WDL    Perceptual State WDL WDL       Thought Process WDL    Thought Process WDL WDL       Intellectual Performance WDL    Intellectual Performance WDL WDL       Stress    Do you feel stress - tense, restless, nervous, or anxious, or unable to sleep at night because your mind is troubled all the time - these days? Not at all       Coping/Stress    Sources of Support significant other       Developmental Stage (Eriksson's Stages of Development)    Developmental Stage Stage 7 (35-65 years/Middle Adulthood) Generativity vs. Stagnation                   Abuse/Neglect    No documentation.                  Legal       Row Name 03/11/25 1020       Financial Resource Strain    How hard is it for you to pay for the very basics like food, housing, medical care, and heating? Not hard       Financial/Legal    Financial/Environmental Concerns none       Legal    Criminal Activity/Legal Involvement none                   Substance Abuse    No documentation.                  Patient Forms    No documentation.                     Toshia  MUKUL Borja

## 2025-03-11 NOTE — PLAN OF CARE
Goal Outcome Evaluation:  Plan of Care Reviewed With: patient           Outcome Evaluation: Patient presented to the hospital with complaints of syncopal episodes.  Full workup in progress.  Patient reports she is independently mobile at baseline with no assistive device.  She reports overwhelming fatigue and sleeping up to 20 hours/day recently.  She reports she gets lightheaded with tremors upon standing.  Patient was in bed and agreeable to therapy.  She demonstrated independent bed mobility.  She sat edge of bed with no loss of balance.  She stood with [facility] [facility], [facility].  Patient was able to ambulate to the bathroom with CGA-min assist, hand-held assist.  Gait was slow with narrow base of support and shuffling steps.  Notable for a few episodes of bilateral knee flexion, which were initially thought to be knee buckling, but were more slow and controlled flexion than a true knee buckle.  Patient recovered on her own and ultimately required only contact-guard assist for safety while ambulating.  While returning back to bed from the bathroom patient demonstrated right upper extremity tremor while holding therapist hand.  This resolved quickly once patient sat edge of bed.  Patient demonstrated functional strength against gravity with inconsistent effort given on manual muscle test.  Patient plans to discharge home with spouse when medically stable.  Patient was clearly not at her baseline on evaluation today, though findings were inconsistent and patient demonstrated limited activity tolerance.  PT will follow while patient is admitted to ensure return to baseline mobility.  Patient may benefit from  PT pending progress.    Anticipated Discharge Disposition (PT): home with assist, home with home health

## 2025-03-12 VITALS
RESPIRATION RATE: 18 BRPM | HEIGHT: 61 IN | TEMPERATURE: 97.9 F | HEART RATE: 88 BPM | BODY MASS INDEX: 28.3 KG/M2 | WEIGHT: 149.91 LBS | DIASTOLIC BLOOD PRESSURE: 72 MMHG | SYSTOLIC BLOOD PRESSURE: 103 MMHG | OXYGEN SATURATION: 95 %

## 2025-03-12 LAB — LAMOTRIGINE SERPL-MCNC: <1 UG/ML (ref 2–20)

## 2025-03-12 PROCEDURE — G0378 HOSPITAL OBSERVATION PER HR: HCPCS

## 2025-03-12 RX ADMIN — NICOTINE 1 PATCH: 14 PATCH TRANSDERMAL at 08:14

## 2025-03-12 RX ADMIN — LAMOTRIGINE 150 MG: 100 TABLET ORAL at 08:13

## 2025-03-12 RX ADMIN — ACETAMINOPHEN 650 MG: 325 TABLET, FILM COATED ORAL at 08:22

## 2025-03-12 RX ADMIN — POTASSIUM CHLORIDE 20 MEQ: 1500 TABLET, EXTENDED RELEASE ORAL at 08:13

## 2025-03-12 RX ADMIN — PRAZOSIN HYDROCHLORIDE 2 MG: 2 CAPSULE ORAL at 08:13

## 2025-03-12 RX ADMIN — VENLAFAXINE HYDROCHLORIDE 225 MG: 75 CAPSULE, EXTENDED RELEASE ORAL at 08:13

## 2025-03-12 NOTE — CASE MANAGEMENT/SOCIAL WORK
Continued Stay Note  [facility]     Patient Name: Francisco Frye  MRN: [id number]  Today's Date: 3/12/2025    Admit Date: 3/8/2025    Plan: [place].   Discharge Plan       Row Name 03/12/25 0943       Plan    Plan [place].    Patient/Family in Agreement with Plan yes    Provided Post Acute Provider List? Yes    Post Acute Provider List Home Health    Provided Post Acute Provider Quality & Resource List? Yes    Post Acute Provider Quality and Resource List Home Health    Delivered To Patient    Method of Delivery In person    Plan Comments Spoke with patient at bedside. Introduced self and reviewed discharge plan. Patient requesting HH at discharge. CCP explained d/t patient not having PCP unable to get HH. CCP provided patient with Provider Directory Assistance number to help obtain PCP. Patient voiced agreement/ understanding with discharge planning.                   Discharge Codes    No documentation.                 Expected Discharge Date and Time       Expected Discharge Date Expected Discharge Time    Mar 11, 2025               Toshia Borja RN

## 2025-03-12 NOTE — PROGRESS NOTES
Name: Francisco Frye ADMIT: 3/8/2025   : 1990  PCP: Provider, No Known    MRN: [id number] LOS: 0 days   AGE/SEX: 34 y.o. female  ROOM: [facility]     Subjective   Subjective   Resting in bed, says she feels tired overall.    Objective   Objective   Vital Signs  Temp:  [97.7 °F (36.5 °C)-99.7 °F (37.6 °C)] 97.9 °F (36.6 °C)  Heart Rate:  [] 86  Resp:  [18] 18  BP: (103-114)/(51-72) 105/68  SpO2:  [94 %-97 %] 94 %  on   ;   Device (Oxygen Therapy): room air  Body mass index is 28.34 kg/m².  Physical Exam  Constitutional:       General: She is not in acute distress.  Pulmonary:      Effort: Pulmonary effort is normal. No respiratory distress.      Breath sounds: No stridor.   Skin:     Coloration: Skin is not jaundiced.      Findings: No bruising.   Neurological:      General: No focal deficit present.      Mental Status: She is alert.   Psychiatric:         Mood and Affect: Mood normal.         Behavior: Behavior normal.         Results Review     I reviewed the patient's new clinical results.  Results from last 7 days   Lab Units 03/10/25  0559 25  0551 25   WBC 10*3/mm3 4.86 6.88 7.82   HEMOGLOBIN g/dL 13.3 13.9 15.3   PLATELETS 10*3/mm3 368 453* 478*     Results from last 7 days   Lab Units 25  0938 03/10/25  0559 25  19125  0551 25   SODIUM mmol/L 135* 137  --  135* 136   POTASSIUM mmol/L 3.0* 3.7  3.7 3.3* 2.6* 3.1*   CHLORIDE mmol/L 94* 104  --  95* 93*   CO2 mmol/L 29.8* 25.2  --  28.0 28.0   BUN mg/dL 10 11  --  18 17   CREATININE mg/dL 0.74 0.75  --  0.92 1.12*   GLUCOSE mg/dL 132* 95  --  118* 111*   EGFR mL/min/1.73 107.7 106.0  --  82.9 65.5     Results from last 7 days   Lab Units 25   ALBUMIN g/dL 4.3   BILIRUBIN mg/dL 0.2   ALK PHOS U/L 89   AST (SGOT) U/L 24   ALT (SGPT) U/L 16     Results from last 7 days   Lab Units 25  0938 03/10/25  0559 25  0551 25   CALCIUM mg/dL 9.0 8.5* 8.9 9.3   ALBUMIN g/dL   "--   --   --  4.3   MAGNESIUM mg/dL 2.2  --   --  2.3   PHOSPHORUS mg/dL  --   --   --  3.7       No results found for: \"HGBA1C\", \"POCGLU\"    MRI Brain With & Without Contrast  Result Date: 3/12/2025  1. No acute intracranial abnormality is identified. There is a tiny amount of fluid in the right mastoid air cells. The remainder of the MRI of the brain is normal. The etiology of the patient's new-onset seizures is not established on this examination.  This report was finalized on 3/12/2025 6:02 AM by Dr. Kyle Hays M.D on Workstation: [id number]      Scheduled Medications  Cariprazine HCl, 6 mg, Oral, Q PM  lamoTRIgine, 150 mg, Oral, Daily  nicotine, 1 patch, Transdermal, Q24H  potassium chloride, 20 mEq, Oral, Daily  prazosin, 2 mg, Oral, BID  senna-docusate sodium, 2 tablet, Oral, BID  sodium chloride, 10 mL, Intravenous, Q12H  venlafaxine XR, 225 mg, Oral, Daily With Breakfast    Infusions   Diet  Diet: Regular/House; Fluid Consistency: Thin (IDDSI 0)       Assessment/Plan     Active Hospital Problems    Diagnosis  POA    **Syncope [R55]  Yes    Bipolar 1 disorder [F31.9]  Unknown    ADD (attention deficit disorder) [F98.8]  Unknown    Anxiety disorder [F41.9]  Unknown    Hypokalemia [E87.6]  Unknown    Prolonged QT interval [R94.31]  Unknown      Resolved Hospital Problems    Diagnosis Date Resolved POA    Lethargy [R53.83] 03/11/2025 Unknown       34 y.o. female admitted with Syncope.      03/12/25  Pending home health and walker delivery.  Discharge tomorrow.    Syncope  Questionable seizure  - differential includes POTS, PNES, epilepsy- new  - neuro consulted  -EEG negative; spell during monitoring without seizure activity  -MRI brain pending     Hypersomnia  - d/w patient plan for ambulatory referral to sleep medicine, order placed  -Krystal porter'd-not on any medications which should because of daytime hypersomnia; patient plans to follow-up with [facility]     Bipolar disorder/PTSD  - resume home psych " meds- vraylar, lamictal, prazosin, effexor  - hold home wellbutrin- can lower seizure threshold         DVT prophylaxis: SCDs  Discussed with patient and IKER Durán .  Anticipated discharge home, when cleared by consultants            Tres Mojica MD  [facility]  03/12/25  07:13 EDT    Documentation delayed- date of service on 03/11/25

## 2025-03-12 NOTE — DISCHARGE PLACEMENT REQUEST
"Francisco Hernandez (34 y.o. Female)       Date of Birth   1990    Social Security Number       Address   [address] [place] [address]    Home Phone   [phone]    MRN   [id number]       Lutheran   None    Marital Status                               Admission Date   3/8/2025    Admission Type   Emergency    Admitting Provider   Kyle Patton MD    Attending Provider   Tres Mojica MD    Department, Room/Bed   [address], [address]       Discharge Date       Discharge Disposition   Home or Self Care    Discharge Destination                                 Attending Provider: Tres Mojica MD    Allergies: Nsaids    Isolation: None   Infection: None   Code Status: CPR    Ht: 154.9 cm (60.98\")   Wt: 68 kg (149 lb 14.6 oz)    Admission Cmt: None   Principal Problem: Syncope [R55]                   Active Insurance as of 3/8/2025       Primary Coverage       Payor Plan Insurance Group Employer/Plan Group    [facility] BY [facility] [id number]       Payor Plan Address Payor Plan Phone Number Payor Plan Fax Number Effective Dates    [address] [address]   11/1/2021 - None Entered    [facility] [address]         Subscriber Name Subscriber Birth Date Member ID       FRANCISCO HERNANDEZ 1990 [id number]                     Emergency Contacts        (Rel.) Home Phone Work Phone Mobile Phone    Shruti Hernandez (Mother) [phone] -- --    taran singh (Significant Other) -- -- [phone]                "

## 2025-03-12 NOTE — CASE MANAGEMENT/SOCIAL WORK
Case Management Discharge Note      Final Note: Home    Provided Post Acute Provider List?: Yes  Post Acute Provider List: Home Health  N/A Provider List Comment: Denies need  Provided Post Acute Provider Quality & Resource List?: Yes  Post Acute Provider Quality and Resource List: Home Health  N/A Quality & Resource List Comment: Denies need  Delivered To: Patient  Method of Delivery: In person    Selected Continued Care - Discharged on 3/12/2025 Admission date: 3/8/2025 - Discharge disposition: Home or Self Care      Destination    No services have been selected for the patient.                Durable Medical Equipment    No services have been selected for the patient.                Dialysis/Infusion    No services have been selected for the patient.                Home Medical Care    No services have been selected for the patient.                Therapy    No services have been selected for the patient.                Community Resources    No services have been selected for the patient.                Community & DME    No services have been selected for the patient.                         Final Discharge Disposition Code: 01 - home or self-care

## 2025-03-23 ENCOUNTER — HOSPITAL ENCOUNTER (EMERGENCY)
Facility: HOSPITAL | Age: 35
Discharge: HOME | End: 2025-03-23
Attending: EMERGENCY MEDICINE | Admitting: EMERGENCY MEDICINE
Payer: COMMERCIAL

## 2025-03-23 ENCOUNTER — APPOINTMENT (OUTPATIENT)
Dept: GENERAL RADIOLOGY | Facility: HOSPITAL | Age: 35
End: 2025-03-23
Payer: COMMERCIAL

## 2025-03-23 VITALS
SYSTOLIC BLOOD PRESSURE: 94 MMHG | TEMPERATURE: 98.6 F | RESPIRATION RATE: 18 BRPM | DIASTOLIC BLOOD PRESSURE: 76 MMHG | HEART RATE: 93 BPM | OXYGEN SATURATION: 91 %

## 2025-03-23 DIAGNOSIS — R07.89 ATYPICAL CHEST PAIN: Primary | ICD-10-CM

## 2025-03-23 LAB
ALBUMIN SERPL-MCNC: 4 G/DL (ref 3.5–5.2)
ALBUMIN/GLOB SERPL: 1.1 G/DL
ALP SERPL-CCNC: 67 U/L (ref 39–117)
ALT SERPL W P-5'-P-CCNC: 12 U/L (ref 1–33)
ANION GAP SERPL CALCULATED.3IONS-SCNC: 15.9 MMOL/L (ref 5–15)
AST SERPL-CCNC: 21 U/L (ref 1–32)
BASOPHILS # BLD AUTO: 0.06 10*3/MM3 (ref 0–0.2)
BASOPHILS NFR BLD AUTO: 0.6 % (ref 0–1.5)
BILIRUB SERPL-MCNC: <0.2 MG/DL (ref 0–1.2)
BUN SERPL-MCNC: 14 MG/DL (ref 6–20)
BUN/CREAT SERPL: 15.9 (ref 7–25)
CALCIUM SPEC-SCNC: 9.7 MG/DL (ref 8.6–10.5)
CHLORIDE SERPL-SCNC: 100 MMOL/L (ref 98–107)
CO2 SERPL-SCNC: 19.1 MMOL/L (ref 22–29)
CREAT SERPL-MCNC: 0.88 MG/DL (ref 0.57–1)
D DIMER PPP FEU-MCNC: 0.29 MCGFEU/ML (ref 0–0.5)
DEPRECATED RDW RBC AUTO: 41 FL (ref 37–54)
EGFRCR SERPLBLD CKD-EPI 2021: 87.5 ML/MIN/1.73
EOSINOPHIL # BLD AUTO: 0.07 10*3/MM3 (ref 0–0.4)
EOSINOPHIL NFR BLD AUTO: 0.7 % (ref 0.3–6.2)
ERYTHROCYTE [DISTWIDTH] IN BLOOD BY AUTOMATED COUNT: 11.9 % (ref 12.3–15.4)
GEN 5 1HR TROPONIN T REFLEX: <6 NG/L
GLOBULIN UR ELPH-MCNC: 3.6 GM/DL
GLUCOSE SERPL-MCNC: 96 MG/DL (ref 65–99)
HCT VFR BLD AUTO: 43.9 % (ref 34–46.6)
HGB BLD-MCNC: 14.7 G/DL (ref 12–15.9)
IMM GRANULOCYTES # BLD AUTO: 0.04 10*3/MM3 (ref 0–0.05)
IMM GRANULOCYTES NFR BLD AUTO: 0.4 % (ref 0–0.5)
LIPASE SERPL-CCNC: 28 U/L (ref 13–60)
LYMPHOCYTES # BLD AUTO: 2.39 10*3/MM3 (ref 0.7–3.1)
LYMPHOCYTES NFR BLD AUTO: 23.5 % (ref 19.6–45.3)
MCH RBC QN AUTO: 31.7 PG (ref 26.6–33)
MCHC RBC AUTO-ENTMCNC: 33.5 G/DL (ref 31.5–35.7)
MCV RBC AUTO: 94.6 FL (ref 79–97)
MONOCYTES # BLD AUTO: 0.73 10*3/MM3 (ref 0.1–0.9)
MONOCYTES NFR BLD AUTO: 7.2 % (ref 5–12)
NEUTROPHILS NFR BLD AUTO: 6.9 10*3/MM3 (ref 1.7–7)
NEUTROPHILS NFR BLD AUTO: 67.6 % (ref 42.7–76)
NRBC BLD AUTO-RTO: 0 /100 WBC (ref 0–0.2)
PLATELET # BLD AUTO: 321 10*3/MM3 (ref 140–450)
PMV BLD AUTO: 9.2 FL (ref 6–12)
POTASSIUM SERPL-SCNC: 3.6 MMOL/L (ref 3.5–5.2)
PROT SERPL-MCNC: 7.6 G/DL (ref 6–8.5)
RBC # BLD AUTO: 4.64 10*6/MM3 (ref 3.77–5.28)
SODIUM SERPL-SCNC: 135 MMOL/L (ref 136–145)
TROPONIN T NUMERIC DELTA: NORMAL
TROPONIN T SERPL HS-MCNC: <6 NG/L
WBC NRBC COR # BLD AUTO: 10.19 10*3/MM3 (ref 3.4–10.8)

## 2025-03-23 PROCEDURE — 99284 EMERGENCY DEPT VISIT MOD MDM: CPT

## 2025-03-23 PROCEDURE — 25010000002 ONDANSETRON PER 1 MG: Performed by: EMERGENCY MEDICINE

## 2025-03-23 PROCEDURE — 83690 ASSAY OF LIPASE: CPT | Performed by: EMERGENCY MEDICINE

## 2025-03-23 PROCEDURE — 85025 COMPLETE CBC W/AUTO DIFF WBC: CPT | Performed by: EMERGENCY MEDICINE

## 2025-03-23 PROCEDURE — 80053 COMPREHEN METABOLIC PANEL: CPT | Performed by: EMERGENCY MEDICINE

## 2025-03-23 PROCEDURE — 93010 ELECTROCARDIOGRAM REPORT: CPT | Performed by: INTERNAL MEDICINE

## 2025-03-23 PROCEDURE — 96375 TX/PRO/DX INJ NEW DRUG ADDON: CPT

## 2025-03-23 PROCEDURE — 36415 COLL VENOUS BLD VENIPUNCTURE: CPT

## 2025-03-23 PROCEDURE — 71045 X-RAY EXAM CHEST 1 VIEW: CPT

## 2025-03-23 PROCEDURE — 85379 FIBRIN DEGRADATION QUANT: CPT | Performed by: EMERGENCY MEDICINE

## 2025-03-23 PROCEDURE — 84484 ASSAY OF TROPONIN QUANT: CPT | Performed by: EMERGENCY MEDICINE

## 2025-03-23 PROCEDURE — 25010000002 HYDROMORPHONE PER 4 MG: Performed by: EMERGENCY MEDICINE

## 2025-03-23 PROCEDURE — 96374 THER/PROPH/DIAG INJ IV PUSH: CPT

## 2025-03-23 PROCEDURE — 93005 ELECTROCARDIOGRAM TRACING: CPT | Performed by: EMERGENCY MEDICINE

## 2025-03-23 RX ORDER — HYDROMORPHONE HYDROCHLORIDE 1 MG/ML
0.5 INJECTION, SOLUTION INTRAMUSCULAR; INTRAVENOUS; SUBCUTANEOUS ONCE
Refills: 0 | Status: COMPLETED | OUTPATIENT
Start: 2025-03-23 | End: 2025-03-23

## 2025-03-23 RX ORDER — ONDANSETRON 2 MG/ML
4 INJECTION INTRAMUSCULAR; INTRAVENOUS ONCE
Status: COMPLETED | OUTPATIENT
Start: 2025-03-23 | End: 2025-03-23

## 2025-03-23 RX ADMIN — ONDANSETRON 4 MG: 2 INJECTION, SOLUTION INTRAMUSCULAR; INTRAVENOUS at 19:51

## 2025-03-23 RX ADMIN — HYDROMORPHONE HYDROCHLORIDE 0.5 MG: 1 INJECTION, SOLUTION INTRAMUSCULAR; INTRAVENOUS; SUBCUTANEOUS at 19:51

## 2025-03-23 NOTE — ED PROVIDER NOTES
EMERGENCY DEPARTMENT ENCOUNTER    Room Number:  27/27  PCP: Provider, No Known  Historian: Patient      HPI:  Chief Complaint: Chest pain  A complete HPI/ROS/PMH/PSH/SH/FH are unobtainable due to: None  Context: Francisco Frye is a 34 y.o. female who presents to the ED c/o chest pain.  Patient had recent admission here for passing out spells.  Patient had Zio patch placed.  Patient states she had no chest pain.  Patient states today she took a nap.  Woke up and started having chest pain.  States it was sharp stabbing pain middle of her chest that went to her left arm.  Started about an hour ago and lasted about 20 minutes.  Patient states the pain is gone now.  Patient has been taking her medications. has had no fevers or chills.  Has had no vomiting or diarrhea.            PAST MEDICAL HISTORY  Active Ambulatory Problems     Diagnosis Date Noted    Syncope 03/08/2025    Bipolar 1 disorder 03/08/2025    ADD (attention deficit disorder) 03/08/2025    Anxiety disorder 03/08/2025    Hypokalemia 03/08/2025    Prolonged QT interval 03/08/2025     Resolved Ambulatory Problems     Diagnosis Date Noted    Lethargy 03/08/2025     Past Medical History:   Diagnosis Date    Anxiety     Bipolar disorder     Depression     Night terrors     PTSD (post-traumatic stress disorder)          PAST SURGICAL HISTORY  Past Surgical History:   Procedure Laterality Date    APPENDECTOMY      CHOLECYSTECTOMY      TUBAL ABDOMINAL LIGATION           FAMILY HISTORY  No family history on file.      SOCIAL HISTORY  Social History     Socioeconomic History    Marital status:    Tobacco Use    Smoking status: Every Day     Types: Cigarettes    Smokeless tobacco: Never    Tobacco comments:     Smokes 1 pack every 4 days   Vaping Use    Vaping status: Never Used   Substance and Sexual Activity    Alcohol use: No    Drug use: Yes     Types: Amphetamines     Comment: prescription    Sexual activity: Defer         ALLERGIES  Nsaids        REVIEW  OF SYSTEMS  Review of Systems   Chest pain      PHYSICAL EXAM  ED Triage Vitals   Temp Heart Rate Resp BP SpO2   03/23/25 1748 03/23/25 1747 03/23/25 1747 03/23/25 1747 03/23/25 1747   98.6 °F (37 °C) 105 18 126/79 98 %      Temp src Heart Rate Source Patient Position BP Location FiO2 (%)   03/23/25 1748 -- -- -- --   Tympanic           Physical Exam      GENERAL: no acute distress  HENT: nares patent  EYES: no scleral icterus  CV: regular rhythm, normal rate  RESPIRATORY: normal effort.  Lungs are clear  ABDOMEN: soft, nondistended nontender  MUSCULOSKELETAL: no deformity  NEURO: alert, moves all extremities, follows commands  PSYCH:  calm, cooperative  SKIN: warm, dry    Vital signs and nursing notes reviewed.          LAB RESULTS  Recent Results (from the past 24 hours)   ECG 12 Lead Chest Pain    Collection Time: 03/23/25  6:00 PM   Result Value Ref Range    QT Interval 333 ms    QTC Interval 427 ms   Comprehensive Metabolic Panel    Collection Time: 03/23/25  6:18 PM    Specimen: Blood   Result Value Ref Range    Glucose 96 65 - 99 mg/dL    BUN 14 6 - 20 mg/dL    Creatinine 0.88 0.57 - 1.00 mg/dL    Sodium 135 (L) 136 - 145 mmol/L    Potassium 3.6 3.5 - 5.2 mmol/L    Chloride 100 98 - 107 mmol/L    CO2 19.1 (L) 22.0 - 29.0 mmol/L    Calcium 9.7 8.6 - 10.5 mg/dL    Total Protein 7.6 6.0 - 8.5 g/dL    Albumin 4.0 3.5 - 5.2 g/dL    ALT (SGPT) 12 1 - 33 U/L    AST (SGOT) 21 1 - 32 U/L    Alkaline Phosphatase 67 39 - 117 U/L    Total Bilirubin <0.2 0.0 - 1.2 mg/dL    Globulin 3.6 gm/dL    A/G Ratio 1.1 g/dL    BUN/Creatinine Ratio 15.9 7.0 - 25.0    Anion Gap 15.9 (H) 5.0 - 15.0 mmol/L    eGFR 87.5 >60.0 mL/min/1.73   D-dimer, Quantitative    Collection Time: 03/23/25  6:18 PM    Specimen: Blood   Result Value Ref Range    D-Dimer, Quantitative 0.29 0.00 - 0.50 MCGFEU/mL   High Sensitivity Troponin T    Collection Time: 03/23/25  6:18 PM    Specimen: Blood   Result Value Ref Range    HS Troponin T <6 <14 ng/L    Lipase    Collection Time: 03/23/25  6:18 PM    Specimen: Blood   Result Value Ref Range    Lipase 28 13 - 60 U/L   CBC Auto Differential    Collection Time: 03/23/25  6:18 PM    Specimen: Blood   Result Value Ref Range    WBC 10.19 3.40 - 10.80 10*3/mm3    RBC 4.64 3.77 - 5.28 10*6/mm3    Hemoglobin 14.7 12.0 - 15.9 g/dL    Hematocrit 43.9 34.0 - 46.6 %    MCV 94.6 79.0 - 97.0 fL    MCH 31.7 26.6 - 33.0 pg    MCHC 33.5 31.5 - 35.7 g/dL    RDW 11.9 (L) 12.3 - 15.4 %    RDW-SD 41.0 37.0 - 54.0 fl    MPV 9.2 6.0 - 12.0 fL    Platelets 321 140 - 450 10*3/mm3    Neutrophil % 67.6 42.7 - 76.0 %    Lymphocyte % 23.5 19.6 - 45.3 %    Monocyte % 7.2 5.0 - 12.0 %    Eosinophil % 0.7 0.3 - 6.2 %    Basophil % 0.6 0.0 - 1.5 %    Immature Grans % 0.4 0.0 - 0.5 %    Neutrophils, Absolute 6.90 1.70 - 7.00 10*3/mm3    Lymphocytes, Absolute 2.39 0.70 - 3.10 10*3/mm3    Monocytes, Absolute 0.73 0.10 - 0.90 10*3/mm3    Eosinophils, Absolute 0.07 0.00 - 0.40 10*3/mm3    Basophils, Absolute 0.06 0.00 - 0.20 10*3/mm3    Immature Grans, Absolute 0.04 0.00 - 0.05 10*3/mm3    nRBC 0.0 0.0 - 0.2 /100 WBC   High Sensitivity Troponin T 1Hr    Collection Time: 03/23/25  7:33 PM    Specimen: Arm, Right; Blood   Result Value Ref Range    HS Troponin T <6 <14 ng/L    Troponin T Numeric Delta         Ordered the above labs and reviewed the results.        RADIOLOGY  XR Chest 1 View  Result Date: 3/23/2025  CXR ONE VIEW  HISTORY: chest pain  COMPARISON: 3/8/2025  TECHNIQUE: single portable AP       The heart size is within normal limits.  There is a submaximal inspiratory result. Allowing for that, there is no convincing evidence of infiltrate, effusion or pneumothorax.    This report was finalized on 3/23/2025 6:58 PM by Dr. Mian Mcgowan M.D on Workstation: [id number]        Ordered the above noted radiological studies.  Chest x-ray interpreted by me and shows no evidence of pneumothorax          PROCEDURES  Procedures      EKG           EKG time: 1800  Rhythm/Rate: Normal sinus rhythm 99  P waves and FL: Normal P waves  QRS, axis: Normal QRS  ST and T waves: Normal ST-T wave    Interpreted Contemporaneously by me, independently viewed  Unchanged compared to prior 3/10/2025      MEDICATIONS GIVEN IN ER  Medications   HYDROmorphone (DILAUDID) injection 0.5 mg (0.5 mg Intravenous Given 3/23/25 1951)   ondansetron (ZOFRAN) injection 4 mg (4 mg Intravenous Given 3/23/25 1951)                   MEDICAL DECISION MAKING, PROGRESS, and CONSULTS    All labs have been independently reviewed by me.  All radiology studies have been reviewed by me and I have also reviewed the radiology report.   EKG's independently viewed and interpreted by me.  Discussion below represents my analysis of pertinent findings related to patient's condition, differential diagnosis, treatment plan and final disposition.      Additional sources:  - Discussed/ obtained information from independent historians: None    - External (non-ED) record review: Epic reviewed patient recently admitted 3/8/2025 for syncope    - Chronic or social conditions impacting care: None    - Shared decision making: None      Orders placed during this visit:  Orders Placed This Encounter   Procedures    XR Chest 1 View    Comprehensive Metabolic Panel    D-dimer, Quantitative    High Sensitivity Troponin T    Lipase    CBC Auto Differential    High Sensitivity Troponin T 1Hr    ECG 12 Lead Chest Pain    CBC & Differential         Additional orders considered but not ordered:  None        Differential diagnosis includes but is not limited to:    Chest wall pain versus ACS versus reflux      Independent interpretation of labs, radiology studies, and discussions with consultants:  ED Course as of 03/23/25 2037   Sun Mar 23, 2025   2009 20:09 EDT  Presents for chest pain of unclear etiology.  Patient states pain started middle chest patient has D-dimer that is negative so doubt PE.  Patient has serial  troponins that are negative.  Patient has chest x-ray shows no evidence of pneumonia or pneumothorax.  Patient will be discharged home.  She does have follow-up appointment with cardiology.  Instructed to return here for any concerns. [SL]      ED Course User Index  [SL] Ramesh Carroll MD                 DIAGNOSIS  Final diagnoses:   Atypical chest pain         DISPOSITION  DISCHARGE    Patient discharged in stable condition.    Reviewed implications of results, diagnosis, meds, responsibility to follow up, warning signs and symptoms of possible worsening, potential complications and reasons to return to ER, including worsening symptoms    Patient/Family voiced understanding of above instructions.    Discussed plan for discharge, as there is no emergent indication for admission. Patient referred to primary care provider for BP management due to today's BP. Pt/family is agreeable and understands need for follow up and repeat testing.  Pt is aware that discharge does not mean that nothing is wrong but it indicates no emergency is present that requires admission and they must continue care with follow-up as given below or physician of their choice.     FOLLOW-UP  [facility] MEDICAL [facility] CARDIOLOGY  [address]  [place] [address] [phone]  Schedule an appointment as soon as possible for a visit            Medication List      No changes were made to your prescriptions during this visit.                  Latest Documented Vital Signs:  As of 20:37 EDT  BP- 94/76 HR- 93 Temp- 98.6 °F (37 °C) (Tympanic) O2 sat- 91%              --    Please note that portions of this were completed with a voice recognition program.       Note Disclaimer: At [facility], we believe that sharing information builds trust and better relationships. You are receiving this note because you are receiving care at [facility] or recently visited. It is possible you will see health information before a  provider has talked with you about it. This kind of information can be easy to misunderstand. To help you fully understand what it means for your health, we urge you to discuss this note with your provider.            Ramesh Carroll MD  03/23/25 2033

## 2025-03-23 NOTE — ED TRIAGE NOTES
Pt to ED via EMS from home with c/o chest pain. Pt wearing holter monitor and was to have f/u on 3/28, chest pain started this AM. +nausea.

## 2025-03-24 ENCOUNTER — TELEPHONE (OUTPATIENT)
Dept: CARDIOLOGY | Age: 35
End: 2025-03-24

## 2025-03-24 LAB
QT INTERVAL: 333 MS
QTC INTERVAL: 427 MS

## 2025-03-24 NOTE — TELEPHONE ENCOUNTER
Caller: Francisco Frye    Relationship to patient: Self    Best call back number: [phone]     Chief complaint: CHEST PAIN     Type of visit: FOLLOW UP     Requested date: SOONER THAN SCHEDULED         Additional notes:PATIENT WAS SEEN AND TREATED AT THE ED ON 3/24/25 FOR SYMPTOMS AND DISCHARGE SUMMARY STATES TO FOLLOW UP ASAP.

## 2025-03-26 ENCOUNTER — OFFICE VISIT (OUTPATIENT)
Dept: CARDIOLOGY | Age: 35
End: 2025-03-26
Payer: COMMERCIAL

## 2025-03-26 VITALS — OXYGEN SATURATION: 97 % | BODY MASS INDEX: 32.98 KG/M2 | HEIGHT: 60 IN | WEIGHT: 168 LBS

## 2025-03-26 DIAGNOSIS — R06.09 DOE (DYSPNEA ON EXERTION): ICD-10-CM

## 2025-03-26 DIAGNOSIS — R55 SYNCOPE AND COLLAPSE: ICD-10-CM

## 2025-03-26 DIAGNOSIS — R07.9 CHEST PAIN, UNSPECIFIED TYPE: Primary | ICD-10-CM

## 2025-03-26 DIAGNOSIS — I20.9 ANGINA PECTORIS: ICD-10-CM

## 2025-03-26 PROCEDURE — 99214 OFFICE O/P EST MOD 30 MIN: CPT | Performed by: FAMILY MEDICINE

## 2025-03-26 PROCEDURE — 93000 ELECTROCARDIOGRAM COMPLETE: CPT | Performed by: FAMILY MEDICINE

## 2025-03-26 RX ORDER — HYDROXYZINE HYDROCHLORIDE 50 MG/1
50 TABLET, FILM COATED ORAL AS NEEDED
COMMUNITY

## 2025-03-26 NOTE — PROGRESS NOTES
Date of Office Visit: 2025  Encounter Provider: SUZANNE Mejias  Place of Service: [facility] CARDIOLOGY  Established cardiologist: Toshia Mcclendon MD  Patient Name: Francisco Frye  :1990      Patient ID:  Francisco Frye is a 34 y.o. female is here for  followup    With a pertinent medical history of ADD, bipolar disorder, anxiety and depression, PTSD on several psychiatric medications.    History of Present Illness  She was admitted to [facility] ED 3/9 through 3/12/2025 with a chief complaint of syncope.  Dr. Mcclendon saw her in consult for syncope she had reported 3-4 syncopal episodes the week prior with fatigue.She was evaluated at [facility] for similar symptoms a few days ago and was discharged from the ER after a negative head CT. She was diagnosed with dehydration.  There was questionable seizure-like activity witnessed by family and reported history of childhood seizures.  In the ED her EKG showed sinus rhythm with prolonged QTc interval of 608 ms.  Her creatinine was elevated at 1.12 and her urine drug screen was positive for amphetamines and THC.  Her potassium was 3.1.  Chest x-ray was negative for acute process.  She had orthostatic Asherman's that were negative.  She was given IV fluids and magnesium.    Echocardiogram 3/10/2025 with an ejection fraction 61 to 65%, normal LV diastolic function.  Trace mitral regurgitation, trace tricuspid regurgitation.     She was dismissed with a Holter monitor this is still pending.     3/23/2025 she presented to the ED with a chief complaint of chest pain.  Was described as sharp stabbing pain in the middle of her chest that radiated to the left arm and lasted about 20 minutes.  There were no associated symptoms.  Her CMP was unremarkable.  D-dimer was not elevated.  High-sensitivity troponin was less than 6 x 2 and her chest x-ray with no acute processes.  She was given 0.5 mg IV Dilaudid and Zofran and  "discharged.    Today Francisco presents for follow-up.  She tells me since she was last seen in the hospital she has not had any recurrence of chest pain but she has had shortness of breath with exertion difficulty climbing her stairs she continues to be quite fatigued and often has to sit and rest after doing simple chores at home.  She is sleeping a lot.  She does get lightheaded and dizzy.  She mailed in her ZIO yesterday.  She is a former smoker.  Her paternal grandfather had a pacemaker.  Her maternal grandfather passed away from MI.  Her dad has had 2 coronary stents the first placed in his 40s.  She has 2 children ages 11 and 12.  She is a homemaker.      Current Outpatient Medications on File Prior to Visit   Medication Sig Dispense Refill    amphetamine-dextroamphetamine (ADDERALL) 20 MG tablet Take 1 tablet by mouth Daily.      buPROPion SR (WELLBUTRIN SR) 200 MG 12 hr tablet Take 1 tablet by mouth Every 12 (Twelve) Hours.      hydrOXYzine (ATARAX) 50 MG tablet Take 1 tablet by mouth As Needed for Itching.      lamoTRIgine (LaMICtal) 150 MG tablet Take 1 tablet by mouth Daily.      potassium chloride (KLOR-CON M20) 20 MEQ CR tablet Take 1 tablet by mouth Daily. 30 tablet 0    prazosin (MINIPRESS) 2 MG capsule Take 1 capsule by mouth 2 (Two) Times a Day.      venlafaxine 225 MG tablet sustained-release 24 hour 24 hr tablet Take 1 tablet by mouth Daily. 30 each 1    Vraylar 6 MG capsule Take 1 capsule by mouth Every Evening.       No current facility-administered medications on file prior to visit.         Procedures    ECG 12 Lead    Date/Time: 3/26/2025 9:56 AM  Performed by: Claudette Sánchez APRN    Authorized by: Claudette Sánchez APRN  Comparison: compared with previous ECG from 3/23/2025  Comparison to previous ECG: Lateral T wave flattening.  QTc 511.  Rhythm: sinus rhythm  BPM: 96              Objective:      Vitals:    03/26/25 0900   SpO2: 97%   Weight: 76.2 kg (168 lb)   Height: 152.4 cm (60\") "     Body mass index is 32.81 kg/m².  Wt Readings from Last 3 Encounters:   03/26/25 76.2 kg (168 lb)   03/10/25 68 kg (149 lb 14.6 oz)   05/07/24 63.5 kg (140 lb)         Constitutional:       Appearance: Healthy appearance. Not in distress.   Eyes:      Pupils: Pupils are equal, round, and reactive to light.   Neck:      Vascular: No JVD.   Pulmonary:      Effort: Pulmonary effort is normal.      Breath sounds: Normal breath sounds.   Cardiovascular:      Normal rate. Regular rhythm.      Murmurs: There is no murmur.   Pulses:     Intact distal pulses.   Edema:     Peripheral edema absent.   Abdominal:      General: Bowel sounds are normal.      Palpations: Abdomen is soft.   Musculoskeletal:      Cervical back: Normal range of motion. Skin:     General: Skin is warm and dry.   Neurological:      Mental Status: Alert, oriented to person, place, and time and oriented to person, place and time.   Psychiatric:         Speech: Speech normal.         Lab Review:   3/23/2025 high-sensitivity troponin was normal x 2.  D-dimer was not elevated.  3/5/2025 TSH WNL.    Assessment:     1. Chest pain, unspecified type    2. Angina pectoris    3. JANSEN (dyspnea on exertion)    4. Syncope and collapse    Chest pain.  Not present today.  This occurred 3/23/2025 it was left-sided to midsternal heavy squeezing and radiated to the left arm lasting approximately 20 minutes.   Dyspnea on exertion, exertional fatigue.  This has been present for several weeks and it has progressed.  Syncope of uncertain etiology, recurrent.  Normal EEG.  Not orthostatic.  QTc prolongation this improved with potassium replacement.  QTc is 511 today.  It was 625 on 3/9/2025.  This may be in the setting of Vyvanse use she was since changed to Adderall and  Effexor was changed to Wellbutrin by psych.   Family history of early onset CAD, father received coronary stents in his 40s.   ADD, PTSD, depression, bipolar disorder  Seizure-like activity, normal EEG, MRI  brain unremarkable.  She follows with neurology.   Fatigue exertional and at risk.  Sleep medicine referral was recommended, this is scheduled for 3/31/2025    Plan:   No medication changes were made  Recent episode of chest pain, exertional dyspnea and exertional fatigue, with family history early onset CAD.  We discussed stress testing versus CT of the chest today. She would like to move forward with stress testing and this was ordered.   Maeve was mailed back and yesterday we are waiting results of this.   Keep her follow-up with me 4/24/2025 as scheduled.     Thank you for allowing me to participate in this patient's care. Please call with any questions or concerns.          Dragon dictation device was utilized in this note.

## 2025-03-28 ENCOUNTER — TELEPHONE (OUTPATIENT)
Dept: CARDIOLOGY | Age: 35
End: 2025-03-28
Payer: COMMERCIAL

## 2025-03-31 ENCOUNTER — HOSPITAL ENCOUNTER (OUTPATIENT)
Dept: CARDIOLOGY | Facility: HOSPITAL | Age: 35
Discharge: HOME | End: 2025-03-31
Admitting: FAMILY MEDICINE
Payer: COMMERCIAL

## 2025-03-31 ENCOUNTER — TELEPHONE (OUTPATIENT)
Dept: CARDIOLOGY | Age: 35
End: 2025-03-31

## 2025-03-31 ENCOUNTER — OFFICE VISIT (OUTPATIENT)
Dept: SLEEP MEDICINE | Facility: HOSPITAL | Age: 35
End: 2025-03-31
Payer: COMMERCIAL

## 2025-03-31 VITALS — HEIGHT: 60 IN | WEIGHT: 167.99 LBS | BODY MASS INDEX: 32.98 KG/M2

## 2025-03-31 VITALS — HEART RATE: 101 BPM | HEIGHT: 60 IN | BODY MASS INDEX: 30.82 KG/M2 | OXYGEN SATURATION: 99 % | WEIGHT: 157 LBS

## 2025-03-31 DIAGNOSIS — R07.9 CHEST PAIN, UNSPECIFIED TYPE: ICD-10-CM

## 2025-03-31 DIAGNOSIS — I20.9 ANGINA PECTORIS: ICD-10-CM

## 2025-03-31 DIAGNOSIS — R55 SYNCOPE AND COLLAPSE: ICD-10-CM

## 2025-03-31 DIAGNOSIS — G47.33 OSA (OBSTRUCTIVE SLEEP APNEA): ICD-10-CM

## 2025-03-31 DIAGNOSIS — G47.19 EXCESSIVE DAYTIME SLEEPINESS: ICD-10-CM

## 2025-03-31 DIAGNOSIS — G47.10 HYPERSOMNIA: Primary | ICD-10-CM

## 2025-03-31 DIAGNOSIS — R94.31 PROLONGED QT INTERVAL: ICD-10-CM

## 2025-03-31 DIAGNOSIS — R06.09 DOE (DYSPNEA ON EXERTION): ICD-10-CM

## 2025-03-31 DIAGNOSIS — R55 SYNCOPE, UNSPECIFIED SYNCOPE TYPE: ICD-10-CM

## 2025-03-31 LAB
BH CV NUCLEAR PRIOR STUDY: 2
BH CV REST NUCLEAR ISOTOPE DOSE: 10.4 MCI
BH CV STRESS BP STAGE 1: NORMAL
BH CV STRESS BP STAGE 2: NORMAL
BH CV STRESS DURATION MIN STAGE 1: 3
BH CV STRESS DURATION MIN STAGE 2: 3
BH CV STRESS DURATION MIN STAGE 3: 0
BH CV STRESS DURATION SEC STAGE 1: 0
BH CV STRESS DURATION SEC STAGE 2: 0
BH CV STRESS DURATION SEC STAGE 3: 44
BH CV STRESS GRADE STAGE 1: 10
BH CV STRESS GRADE STAGE 2: 12
BH CV STRESS GRADE STAGE 3: 14
BH CV STRESS HR STAGE 1: 138
BH CV STRESS HR STAGE 2: 152
BH CV STRESS HR STAGE 3: 162
BH CV STRESS METS STAGE 1: 5
BH CV STRESS METS STAGE 2: 7.5
BH CV STRESS METS STAGE 3: 8
BH CV STRESS NUCLEAR ISOTOPE DOSE: 35.5 MCI
BH CV STRESS PROTOCOL 1: NORMAL
BH CV STRESS RECOVERY BP: NORMAL MMHG
BH CV STRESS RECOVERY HR: 117 BPM
BH CV STRESS SPEED STAGE 1: 1.7
BH CV STRESS SPEED STAGE 2: 2.5
BH CV STRESS SPEED STAGE 3: 3.4
BH CV STRESS STAGE 1: 1
BH CV STRESS STAGE 2: 2
BH CV STRESS STAGE 3: 3
MAXIMAL PREDICTED HEART RATE: 184 BPM
PERCENT MAX PREDICTED HR: 88.04 %
SPECT HRT GATED+EF W RNC IV: 50 %
STRESS BASELINE BP: NORMAL MMHG
STRESS BASELINE HR: 76 BPM
STRESS PERCENT HR: 104 %
STRESS POST ESTIMATED WORKLOAD: 8 METS
STRESS POST EXERCISE DUR MIN: 6 MIN
STRESS POST EXERCISE DUR SEC: 44 SEC
STRESS POST PEAK BP: NORMAL MMHG
STRESS POST PEAK HR: 162 BPM
STRESS TARGET HR: 156 BPM

## 2025-03-31 PROCEDURE — 93018 CV STRESS TEST I&R ONLY: CPT | Performed by: INTERNAL MEDICINE

## 2025-03-31 PROCEDURE — 1160F RVW MEDS BY RX/DR IN RCRD: CPT | Performed by: PSYCHIATRY & NEUROLOGY

## 2025-03-31 PROCEDURE — 78452 HT MUSCLE IMAGE SPECT MULT: CPT | Performed by: INTERNAL MEDICINE

## 2025-03-31 PROCEDURE — 93017 CV STRESS TEST TRACING ONLY: CPT

## 2025-03-31 PROCEDURE — G0463 HOSPITAL OUTPT CLINIC VISIT: HCPCS

## 2025-03-31 PROCEDURE — 34310000005 TECHNETIUM TETROFOSMIN KIT: Performed by: FAMILY MEDICINE

## 2025-03-31 PROCEDURE — 78452 HT MUSCLE IMAGE SPECT MULT: CPT

## 2025-03-31 PROCEDURE — 93016 CV STRESS TEST SUPVJ ONLY: CPT | Performed by: INTERNAL MEDICINE

## 2025-03-31 PROCEDURE — 1159F MED LIST DOCD IN RCRD: CPT | Performed by: PSYCHIATRY & NEUROLOGY

## 2025-03-31 PROCEDURE — 99244 OFF/OP CNSLTJ NEW/EST MOD 40: CPT | Performed by: PSYCHIATRY & NEUROLOGY

## 2025-03-31 PROCEDURE — A9502 TC99M TETROFOSMIN: HCPCS | Performed by: FAMILY MEDICINE

## 2025-03-31 RX ADMIN — TETROFOSMIN 1 DOSE: 1.38 INJECTION, POWDER, LYOPHILIZED, FOR SOLUTION INTRAVENOUS at 11:04

## 2025-03-31 RX ADMIN — TETROFOSMIN 1 DOSE: 1.38 INJECTION, POWDER, LYOPHILIZED, FOR SOLUTION INTRAVENOUS at 10:05

## 2025-03-31 NOTE — TELEPHONE ENCOUNTER
Caller: Francisco Frye    Relationship: Self    Best call back number: [phone]    What form or medical record are you requesting: DOCTOR'S EXCUSE    Who is requesting this form or medical record from you: JOB    How would you like to receive the form or medical records (pick-up, mail, fax): [employer]    Timeframe paperwork needed: ASAP    Additional notes: PT IS CALLING TO SEE IF WE CAN UPLOAD A DOCTOR'S EXCUSE TO [employer] FOR HER STRESS TEST TODAY

## 2025-03-31 NOTE — TELEPHONE ENCOUNTER
Left detailed voicemail for Francisco Frye notifying that work [facility] is available [facility] [employer] [facility] (ok per verbal SCOTT).  Requested callback for further questions.    HUB: please transfer to Triage if patient returns call    Thank you,  Yolie ALVARADO RN  Triage Nurse ALIZA  03/31/25 15:14 EDT

## 2025-03-31 NOTE — PROGRESS NOTES
Patient Care Team:  Provider, No Known as PCP - General  Provider, No Known as PCP - Family Medicine  Paul Castellon MD, MPH as Consulting Physician (Sleep Medicine)        History of Present Illness  The patient presents for evaluation of sleep issues.    She was hospitalized at the beginning of last month due to syncope but is also now sleeping 16 to 20 hours a day, which has been ongoing for the past 3 to 4 weeks. This is unusual for her as she typically wakes up early in the morning and goes to bed late at night. She lives alone and has been experiencing night terrors, waking up frequently, sometimes crying or gasping for breath. She has a history of PTSD. She normally sleeps 7 to 8 hours at night and has been told that she snores and stops breathing periodically. She has passed out a few times, including an incident over a week ago where she fell and hit a coffee table after blacking out upon standing up from the couch. There have been a few other similar incidents over the past month. At one point, she went to sleep upstairs in her bed and woke up downstairs sleeping with her dog, not remembering how she got there. She is currently not working and recently got hired at a new job, which is why she is trying to ensure she can work. She can still take her Adderall and sleep throughout the whole day. She estimates she has gained 30 pounds over the past 5 years. She has had coughing or choking arousals, morning headaches, and dry mouth when she wakes up. She is on prazosin, which helps with her nightmares, but she still experiences them a few times a week. She has been on prazosin for a few years now. She is also on Adderall and Vraylar. She is being weaned off Wellbutrin and Effexor. The only new medications are potassium and hydroxyzine. Her potassium levels have been really low. She started having syncopal episodes this past month. What really got her concerned was when she slept 36 hours straight without  "even getting up to go to the bathroom, and that was not after falling or anything. She may have bitten her tongue when she was a kid. She had TMJ where she used to grind her teeth real bad in her sleep, but she does not really have that problem now that her top teeth are fake.    Supplemental Information  She has ADHD and bipolar disorder. She is on Lamictal for bipolar disorder.    MEDICATIONS  Current: Adderall, prazosin, Vraylar, Lamictal, potassium, hydroxyzine  Past: Wellbutrin, Effexor       [place]: 15    Data Reviewed: Medical chart and sleep questionnaire medical chart and sleep questionnaire.  I reviewed her most recent ECG as well      PMH:  Past Medical History:   Diagnosis Date    ADD (attention deficit disorder)     Anxiety     Bipolar disorder     type I    Depression     Night terrors     PTSD (post-traumatic stress disorder)           Allergies:  Nsaids     Medication Review:   Current Outpatient Medications on File Prior to Visit   Medication Sig Dispense Refill    amphetamine-dextroamphetamine (ADDERALL) 20 MG tablet Take 1 tablet by mouth Daily.      buPROPion SR (WELLBUTRIN SR) 200 MG 12 hr tablet Take 1 tablet by mouth Every 12 (Twelve) Hours.      hydrOXYzine (ATARAX) 50 MG tablet Take 1 tablet by mouth As Needed for Itching.      lamoTRIgine (LaMICtal) 150 MG tablet Take 1 tablet by mouth Daily.      potassium chloride (KLOR-CON M20) 20 MEQ CR tablet Take 1 tablet by mouth Daily. 30 tablet 0    prazosin (MINIPRESS) 2 MG capsule Take 1 capsule by mouth 2 (Two) Times a Day.      venlafaxine 225 MG tablet sustained-release 24 hour 24 hr tablet Take 1 tablet by mouth Daily. 30 each 1    Vraylar 6 MG capsule Take 1 capsule by mouth Every Evening.       No current facility-administered medications on file prior to visit.         Vital Signs:    Vitals:    03/31/25 1354   Pulse: 101   SpO2: 99%   Weight: 71.2 kg (157 lb)   Height: 152.4 cm (60\")        Body mass index is 30.66 kg/m².  Neck " Circumference: 14.25 inches  .BMIFOLLOWUP  BMI is >= 30 and <35. (Class 1 Obesity). The following options were offered after discussion;: referral to primary care      Physical Exam:    Constitutional:  Well developed 34 y.o. female that appears in no apparent distress.  Awake & oriented times 3.  Normal mood with normal recent and remote memory and normal judgement.  Eyes:  Conjunctivae normal.  Oropharynx: Moist mucous membranes without exudate and Mallampati 3  Neck: Trachea midline  Respiratory: Effort is not labored  Cardiovascular: Radial pulse regular  Musculoskeletal: Gait appears normal, no digital clubbing evident, no pre-tibial edema        Impression:   Encounter Diagnoses   Name Primary?    Hypersomnia Yes    Prolonged QT interval     Syncope, unspecified syncope type     Excessive daytime sleepiness     BIBI (obstructive sleep apnea)      Patient's BMI is Body mass index is 30.66 kg/m².        Assessment & Plan  1. Sleep disturbances.  She reports excessive sleepiness, sleeping 16 to 20 hours a day for the past three to four weeks, which is atypical for her. She also experiences night terrors, snoring, periodic cessation of breathing, and syncopal episodes. A home sleep test will be conducted to evaluate for potential sleep apnea. This test will measure respiratory effort, airflow, oxygen concentration, heart rate, snoring, and body position. If the home sleep test does not reveal sleep apnea and symptoms persist, further diagnostic testing will be considered.     For the polysomnogram is not to be out so many weeks I would have preferred that but I think I can quickly rule in or rule out sleep apnea with a home sleep apnea test so I will order that.      The patient should practice good sleep hygiene measures.      Weight loss might be beneficial in this patient who has a Body mass index is 30.66 kg/m².      Pathophysiology of BIBI described to the patient.  Cardiovascular complications of untreated BIBI  also reviewed.      The patient was cautioned about the dangers of drowsy driving.    Patient or patient representative verbalized consent for the use of Ambient Listening during the visit with  Sergio Castellon MD for chart documentation. 3/31/2025  14:21 EDT     Sergio Castellon MD  Sleep Medicine  03/31/25  14:20 EDT

## 2025-04-08 ENCOUNTER — HOSPITAL ENCOUNTER (OUTPATIENT)
Dept: SLEEP MEDICINE | Facility: HOSPITAL | Age: 35
Discharge: HOME | End: 2025-04-08
Admitting: PSYCHIATRY & NEUROLOGY
Payer: COMMERCIAL

## 2025-04-08 DIAGNOSIS — G47.10 HYPERSOMNIA: ICD-10-CM

## 2025-04-08 DIAGNOSIS — R55 SYNCOPE, UNSPECIFIED SYNCOPE TYPE: ICD-10-CM

## 2025-04-08 DIAGNOSIS — R94.31 PROLONGED QT INTERVAL: ICD-10-CM

## 2025-04-08 DIAGNOSIS — G47.19 EXCESSIVE DAYTIME SLEEPINESS: ICD-10-CM

## 2025-04-08 DIAGNOSIS — G47.33 OSA (OBSTRUCTIVE SLEEP APNEA): ICD-10-CM

## 2025-04-08 PROCEDURE — G0399 HOME SLEEP TEST/TYPE 3 PORTA: HCPCS

## 2025-04-10 DIAGNOSIS — G47.19 EXCESSIVE DAYTIME SLEEPINESS: ICD-10-CM

## 2025-04-10 DIAGNOSIS — G47.10 HYPERSOMNIA: Primary | ICD-10-CM

## 2025-04-10 DIAGNOSIS — G47.34 NOCTURNAL HYPOXEMIA: ICD-10-CM

## 2025-04-10 DIAGNOSIS — R94.31 PROLONGED QT INTERVAL: ICD-10-CM

## 2025-04-10 DIAGNOSIS — G47.33 OSA (OBSTRUCTIVE SLEEP APNEA): ICD-10-CM

## 2025-04-10 DIAGNOSIS — R55 SYNCOPE, UNSPECIFIED SYNCOPE TYPE: ICD-10-CM

## 2025-04-11 LAB
CV ZIO BASELINE AVG BPM: 94 BPM
CV ZIO BASELINE BPM HIGH: 153 BPM
CV ZIO BASELINE BPM LOW: 46 BPM
CV ZIO DEVICE ANALYSIS TIME: NORMAL
CV ZIO ECT SVE COUNT: 150 EPISODES
CV ZIO ECT SVE CPLT COUNT: 25 EPISODES
CV ZIO ECT SVE CPLT FREQ: NORMAL
CV ZIO ECT SVE FREQ: NORMAL
CV ZIO ECT SVE TPLT COUNT: 0 EPISODES
CV ZIO ECT SVE TPLT FREQ: 0
CV ZIO ECT VE COUNT: 484 EPISODES
CV ZIO ECT VE CPLT COUNT: 0 EPISODES
CV ZIO ECT VE CPLT FREQ: 0
CV ZIO ECT VE FREQ: NORMAL
CV ZIO ECT VE TPLT COUNT: 0 EPISODES
CV ZIO ECT VE TPLT FREQ: 0
CV ZIO ECTOPIC SVE COUPLET RAW PERCENT: 0 %
CV ZIO ECTOPIC SVE ISOLATED PERCENT: 0.01 %
CV ZIO ECTOPIC SVE TRIPLET RAW PERCENT: 0 %
CV ZIO ECTOPIC VE COUPLET RAW PERCENT: 0 %
CV ZIO ECTOPIC VE ISOLATED PERCENT: 0.03 %
CV ZIO ECTOPIC VE TRIPLET RAW PERCENT: 0 %
CV ZIO ENROLLMENT END: NORMAL
CV ZIO ENROLLMENT START: NORMAL
CV ZIO PATIENT EVENTS DIARIES: 0
CV ZIO PATIENT EVENTS TRIGGERS: 3
CV ZIO PAUSE COUNT: 0
CV ZIO PRESCRIPTION STATUS: NORMAL
CV ZIO SVT AVG BPM: 126 BPM
CV ZIO SVT BPM HIGH: 133 BPM
CV ZIO SVT BPM LOW: 106 BPM
CV ZIO SVT COUNT: 4
CV ZIO SVT F EPI AVG BPM: 132 BPM
CV ZIO SVT F EPI BEATS: 6 BEATS
CV ZIO SVT F EPI BPM HIGH: 133 BPM
CV ZIO SVT F EPI BPM LOW: 132 BPM
CV ZIO SVT F EPI DUR: 2.7 SEC
CV ZIO SVT F EPI END: NORMAL
CV ZIO SVT F EPI START: NORMAL
CV ZIO SVT L EPI AVG BPM: 119 BPM
CV ZIO SVT L EPI BEATS: 5 BEATS
CV ZIO SVT L EPI BPM HIGH: 128 BPM
CV ZIO SVT L EPI BPM LOW: 106 BPM
CV ZIO SVT L EPI DUR: 3.5 SEC
CV ZIO SVT L EPI END: NORMAL
CV ZIO SVT L EPI START: NORMAL
CV ZIO TOTAL  ENROLLMENT PERIOD: NORMAL
CV ZIO VT COUNT: 0

## 2025-04-14 ENCOUNTER — TELEPHONE (OUTPATIENT)
Dept: SLEEP MEDICINE | Facility: HOSPITAL | Age: 35
End: 2025-04-14
Payer: COMMERCIAL

## 2025-04-14 NOTE — TELEPHONE ENCOUNTER
I called Pt and went over sleep report and faxed cpap order to [facility]. F/u for compliance scheduled for 6/9/25

## 2025-04-16 ENCOUNTER — RESULTS FOLLOW-UP (OUTPATIENT)
Dept: TELEMETRY | Facility: HOSPITAL | Age: 35
End: 2025-04-16
Payer: COMMERCIAL